# Patient Record
Sex: MALE | Race: WHITE | NOT HISPANIC OR LATINO | URBAN - METROPOLITAN AREA
[De-identification: names, ages, dates, MRNs, and addresses within clinical notes are randomized per-mention and may not be internally consistent; named-entity substitution may affect disease eponyms.]

---

## 2017-01-17 DIAGNOSIS — F90.2 ATTENTION DEFICIT HYPERACTIVITY DISORDER (ADHD), COMBINED TYPE: ICD-10-CM

## 2017-01-17 DIAGNOSIS — F17.200 TOBACCO USE DISORDER: ICD-10-CM

## 2017-01-17 DIAGNOSIS — F90.9 ADULT ADHD: Primary | ICD-10-CM

## 2017-01-17 NOTE — TELEPHONE ENCOUNTER
Patient requesting refill of Adderall.   Last appointment with Dr. Zavala was 10/10/16.   Last prescription 12/10/16  #90 with no refills.   Lab work was done 12/19/16.   First prescription pended to PCP.

## 2017-01-19 RX ORDER — DEXTROAMPHETAMINE SACCHARATE, AMPHETAMINE ASPARTATE, DEXTROAMPHETAMINE SULFATE AND AMPHETAMINE SULFATE 5; 5; 5; 5 MG/1; MG/1; MG/1; MG/1
20 TABLET ORAL 3 TIMES DAILY PRN
Qty: 90 TABLET | Refills: 0 | Status: SHIPPED | OUTPATIENT
Start: 2017-01-19 | End: 2017-02-20

## 2017-01-19 NOTE — TELEPHONE ENCOUNTER
Regarding: Rx REQUESTS  Contact: [phone]  PT called and stated that he talked to someone the other day re: a request for his prescription for amphetamine-dextroamphetamine (ADDERALL) 20 MG tablet to be mailed to him.  He also stated that he would like to get a prescription for Chantax as discussed and would appreciate a CB regarding both.    January 19, 2017 10:47 AM  LM for patient. Advised will leave prescription for Adderall in lock box at [facility] main entrance. Dr. Zavala requesting urine drug screen. Once that is done, pending results, she would likely send out an additional 2 months worth of prescription refills. Requested patient call back to discuss further, and also to discuss prescription for Chantix.  Chelsea Florentino RN, Care Coordinator    January 19, 2017 11:34 AM  Spoke with patient. He will do urine drug screen when he picks up Adderall prescription. Patient is requesting a prescription for Chantix. He is very motivated to quit smoking at this time. Has tried patches, Wellbutrin, and cold turkey in the past and those options have not worked for him. About 6 years ago, he tried Chantix and was able to quit smoking for about 6 months. He routinely smokes 1+ packs of cigarettes per day.  Chelsea Florentino RN, Care Coordinator

## 2017-01-19 NOTE — TELEPHONE ENCOUNTER
Will do urine drug screen and RX one month supply.   Further refills if appropriate after urine drug screen assessment  Best wishes,  Moises Zavala MD

## 2017-01-27 ENCOUNTER — OFFICE VISIT (OUTPATIENT)
Dept: PHARMACY | Facility: CLINIC | Age: 27
End: 2017-01-27
Payer: MEDICAID

## 2017-01-27 DIAGNOSIS — F17.200 TOBACCO USE DISORDER: ICD-10-CM

## 2017-01-27 DIAGNOSIS — F90.9 ATTENTION DEFICIT HYPERACTIVITY DISORDER (ADHD), UNSPECIFIED ADHD TYPE: Primary | ICD-10-CM

## 2017-01-27 DIAGNOSIS — F90.9 ADULT ADHD: ICD-10-CM

## 2017-01-27 DIAGNOSIS — K21.9 GASTROESOPHAGEAL REFLUX DISEASE WITHOUT ESOPHAGITIS: ICD-10-CM

## 2017-01-27 PROCEDURE — 80307 DRUG TEST PRSMV CHEM ANLYZR: CPT | Performed by: FAMILY MEDICINE

## 2017-01-27 PROCEDURE — 99605 MTMS BY PHARM NP 15 MIN: CPT | Performed by: PHARMACIST

## 2017-01-27 PROCEDURE — 99607 MTMS BY PHARM ADDL 15 MIN: CPT | Performed by: PHARMACIST

## 2017-01-27 PROCEDURE — 40000358 ZZHCL STATISTIC DRUG SCREEN MULTIPLE (METRO): Performed by: FAMILY MEDICINE

## 2017-01-27 NOTE — PROGRESS NOTES
"SUBJECTIVE/OBJECTIVE:                                                    Gabe Reddy is a 26 year old male coming in for an initial visit for Medication Therapy Management.  He was referred to me from Dr. Zavala.     Chief Complaint: Pt interested in options for smoking cessation.    Allergies/ADRs: None  Tobacco: > 1 pack per day -  is interested in quitting Tobacco Cessation Action Plan: Medication Therapy Management  (Referral to MTM)  Alcohol: 1-3 beverages / week    Medication Adherence: no issues reported    Attention Deficit Hyperactivity Disorder (ADHD): Current medication(s): Adderall 20 mg tablets three times daily as needed. Mood appears good. Pt is currently being followed by Dr. Zavala. Pt has no concerns about this medication today.    GERD: Current medication(s): Prilosec (omeprazole) 20 mg once daily. The patient does notice symptoms if they miss a dose. Patient has tried a trial off of therapy and is not interested in doing so. Patient feels that current regimen is effective.    Smoking Cessation:  How much does he smoke: 1-1.5 pack/day  Why does he smoke: Pt reports that he often smokes as a result of boredom. He will occassionaly smoke to decompress after work.   Triggers for smoking include: Alcohol, driving, and after eating dinner.  How long has he been smokin years, started smoking at ~15 years of age.  What is the most he ever smoked: 2 packs/day, occurred a couple times.  What is the least he ever smoked: 1 pack/day.  Tried quitting in the past: Yes.  How many times:  5.  For how long: At 20 years of age, pt was prescribed Chantix and was able to quit smoking for ~20 days. Pt mentioned experiencing \"weird dreams\" with Chantix. Last week, the patient went \"cold turkey\" for 4 days before restarting. This was the longest period that pt was without cigarettes using this method. He lived at his friends' homes in order to distract him from smoking.    What worked/didn t work in the " "past: At 19 years of age, pt was prescribed bupropion for depression and smoking cessation. Pt found the medication to be ineffective for quitting smoking. With the past year, pt contacted the [place] Quit Line and was sent the nicotine replacement patch system (steps 1-3). The nicotine replacement patches did not help with cravings. In addition, pt reported he was unwilling to quit at the time. He has used E-cigarettes, which caused a lot of lung irration and coughing, resulting in discontinuation. Pt has tried non-conventional smoking cessation methods, including sucking on straws and burning candles. Pt has not tried nicotine gum or lozenges. Of note, pt enjoys using the \"Quit Smoking\" lexus on his phone, this lexus has been highly motivational.     Why does he want to quit (motivators for quitting): Pt is concerned about his health, wanting to run/walk without experiencing shortness of breath. He is worried about the long term health effects of smoking.  What scares you about quitting? Continuing to have urges.  How important is it for you to quit on a scale of 0 - 10? 9-10.  How confident are you that you can stop smoking on a scale of 0-10: 8  History of seizures/bipolar disease: No  Occupation:  at [employer]    Current labs include:  BP Readings from Last 3 Encounters:   10/10/16 115/78   09/19/16 122/82   07/18/16 120/81     Today's Vitals: /89 mmHg  Pulse 89  Wt 217 lb (98.431 kg)  A1C      5.7   12/19/2016.  CHOL      200   12/19/2016  TRIG       90   12/19/2016  HDL       45   12/19/2016  LDL      138   12/19/2016    Liver Function Studies -   Recent Labs   Lab Test  12/19/16   1844   PROTTOTAL  8.4   ALBUMIN  4.3   BILITOTAL  0.5   ALKPHOS  77   AST  29   ALT  50       No results found for: UCRR, MICROL, UMALCR    Last Basic Metabolic Panel:  NA      138   12/19/2016   POTASSIUM      4.4   12/19/2016  CHLORIDE      106   12/19/2016  BUN       11   12/19/2016  CR     0.89   " "12/19/2016  GFR ESTIMATE   Date Value Ref Range Status   12/19/2016 >90  Non  GFR Calc   >60 mL/min/1.7m2 Final   06/16/2012 >90 >60 mL/min/1.7m2 Final     GFR ESTIMATE IF BLACK   Date Value Ref Range Status   12/19/2016 >90   GFR Calc   >60 mL/min/1.7m2 Final   06/16/2012 >90 >60 mL/min/1.7m2 Final     TSH   Date Value Ref Range Status   12/19/2016 0.64 0.40 - 4.00 mU/L Final   ]    Most Recent Immunizations   Administered Date(s) Administered     DTAP (<7y) 01/22/1996     Hepatitis B 11/24/2000     IPV 01/22/1996     Influenza (IIV3) 12/09/2014     MMR 09/24/2002     TD (ADULT, 7+) 09/24/2002     TDAP (BOOSTRIX AGES 10-64) 05/06/2014     ASSESSMENT:                                                       Current medications were reviewed today.     Medication Adherence: no issues identified    Attention Deficit Hyperactivity Disorder (ADHD): Stable.    GERD: Needs improvement. Consider switching from daily omeprazole (Prilosec) to ranitidine (Zantac). Prolonged use of omeprazole (Prilosec) can increase risk for electrolyte abnormalities (low calcium, low magnesium), reducing pt's overall bone density. Pt is not open to other med changes today.    Smoking Cessation: Needs improvement. Supplied pt with the \"Quit Plan\" booklet. Pt uninterested in nicotine replacement at this time. Since Chantix appeared to be effective in the past, pt interested in a re-challenge. Pt scheduled quit date for next Sunday, February 4th.          PLAN:                                                      1. I sent order for Chantix to pt's pharmacy. Pt to start Chantix this week.     2. Pt to let us know when he is ready to try taper off omeprazole.    I spent 60 minutes with this patient today.  All changes were made via collaborative practice agreement with Moises Zavala. A copy of the visit note was provided to the patient's primary care provider.    Will follow up in two weeks in office.    The " patient was given a summary of these recommendations as an after visit summary.     Sonal Elias PharmD  Medication Therapy Management Provider  Phone:[phone]  Pager: [phone]

## 2017-01-27 NOTE — PATIENT INSTRUCTIONS
"Recommendations from today's MTM visit:                                                    MTM (medication therapy management) is a service provided by a clinical pharmacist designed to help you get the most of out of your medicines.   Today we reviewed what your medicines are for, how to know if they are working, that your medicines are safe and how to make your medicine regimen as easy as possible.     1. Consider switching from daily omeprazole (Prilosec) to ranitidine (Zantac). Prolonged use of omeprazole (Prilosec) can increase risk for electrolyte abnormalities (low calcium, low magnesium), reducing your overall bone density.         2. I sent order for Chantix to your pharmacy. This medication may cause mild nausea and weird dreams. Discontinue this medication and contact your provider in the case of mood changes, traumatic dreams, and suicidal thoughts.     3. Start your \"Quit Plan\" booklet.     Next MTM visit: I will call you again in two Fridays around 2:30pm    To schedule another MTM appointment, please call the clinic directly or you may call the MTM scheduling line at [phone] or toll-free at [phone].     My Clinical Pharmacist's contact information:                                                      It was a pleasure seeing you today!  Please feel free to contact me with any questions or concerns you have.      Sonal Elias, [place]  Medication Therapy Management Provider  Phone:[phone]  Pager: [phone]    You may receive a survey about the MTM services you received.  I would appreciate your feedback to help me serve you better in the future. Please fill it out and return it when you can. Your comments will be anonymous.            "

## 2017-01-30 LAB
ACETAMINOPHEN QUAL: NEGATIVE
AMANTADINE: NEGATIVE
AMITRIPTYLINE QUAL: NEGATIVE
AMOXAPINE: NEGATIVE
AMPHETAMINES QUAL: POSITIVE
ATROPINE: NEGATIVE
BENZODIAZ UR QL: ABNORMAL
CAFFEINE QUAL: NEGATIVE
CANNABINOIDS UR QL SCN: ABNORMAL
CARBAMAZEPINE QUAL: NEGATIVE
CHLORPHENIRAMINE: NEGATIVE
CHLORPROMAZINE: NEGATIVE
CITALOPRAM QUAL: NEGATIVE
CLOMIPRAMINE QUAL: NEGATIVE
COCAINE QUAL: NEGATIVE
COCAINE UR QL: ABNORMAL
CODEINE QUAL: NEGATIVE
DESIPRAMINE QUAL: NEGATIVE
DEXTROMETHORPHAN: NEGATIVE
DIPHENHYDRAMINE: POSITIVE
DOXEPIN/METABOLITE: NEGATIVE
DOXYLAMINE: NEGATIVE
EPHEDRINE OR PSEUDO: NEGATIVE
FENTANYL QUAL: NEGATIVE
FLUOXETINE AND METAB: NEGATIVE
HYDROCODONE QUAL: NEGATIVE
HYDROMORPHONE QUAL: NEGATIVE
IBUPROFEN QUAL: NEGATIVE
IMIPRAMINE QUAL: NEGATIVE
LAMOTRIGINE QUAL: NEGATIVE
LOXAPINE: NEGATIVE
MAPROTYLINE: NEGATIVE
MDMA QUAL: NEGATIVE
MEPERIDINE QUAL: NEGATIVE
METHAMPHETAMINE: NEGATIVE
METHODONE QUAL: NEGATIVE
MORPHINE QUAL: NEGATIVE
NICOTINE: POSITIVE
NORTRIPTYLINE QUAL: NEGATIVE
OLANZAPINE QUAL: NEGATIVE
OPIATES UR QL SCN: ABNORMAL
OXYCODONE QUAL: NEGATIVE
PENTAZOCINE: NEGATIVE
PHENCYCLIDINE QUAL: NEGATIVE
PHENMETRAZINE: NEGATIVE
PHENTERMINE: NEGATIVE
PHENYLBUTAZONE: NEGATIVE
PHENYLPROPANOLAMINE: NEGATIVE
PROPOXPHENE QUAL: NEGATIVE
PROPRANOLOL QUAL: NEGATIVE
PYRILAMINE: NEGATIVE
SALICYLATE QUAL: NEGATIVE
THEOBROMINE: NEGATIVE
TOPIRAMATE QUAL: NEGATIVE
TRIMIPRAMINE QUAL: NEGATIVE
VENLAFAXINE QUAL: ABNORMAL

## 2017-02-01 VITALS
DIASTOLIC BLOOD PRESSURE: 89 MMHG | WEIGHT: 217 LBS | HEART RATE: 89 BPM | BODY MASS INDEX: 32.03 KG/M2 | SYSTOLIC BLOOD PRESSURE: 133 MMHG

## 2017-02-10 ENCOUNTER — TELEPHONE (OUTPATIENT)
Dept: PHARMACY | Facility: CLINIC | Age: 27
End: 2017-02-10

## 2017-02-10 NOTE — TELEPHONE ENCOUNTER
Pt called to update on progress with quitting smoking. Pt reports no side effects on Chantix since starting on Saturday the 4th. Pt will be quitting on the 12th (this coming Sunday). Pt is smoking 1/2 ppd, maybe a little more. He does not foresee any specific barriers to quitting this weekend and has plans to keep himself busy. We will talk again next week to see how pt is doing after his quit date.    Sonal Elias, PharmD  Medication Therapy Management Provider  Phone:[phone]  Pager: [phone]

## 2017-02-16 ENCOUNTER — TELEPHONE (OUTPATIENT)
Dept: PHARMACY | Facility: CLINIC | Age: 27
End: 2017-02-16

## 2017-02-16 DIAGNOSIS — F17.200 TOBACCO USE DISORDER: Primary | ICD-10-CM

## 2017-02-16 RX ORDER — NICOTINE 21 MG/24HR
1 PATCH, TRANSDERMAL 24 HOURS TRANSDERMAL EVERY 24 HOURS
Qty: 14 PATCH | Refills: 1 | Status: SHIPPED | OUTPATIENT
Start: 2017-02-16 | End: 2017-03-27

## 2017-02-16 NOTE — TELEPHONE ENCOUNTER
Pt called to discuss smoking cessation. He quit smoking on Saturday night. Pt was having difficulty on day 3 and so he is using his Chantix and the 14mg patch together. He still has urges but he feels much better on the patch. He has two weeks worth of patches and is wondering if he needs more. I sent an order for 2 weeks worth more so that he could be on the 14mg patches for at least 4 weeks. We will talk again in 2 weeks.    Sonal Elias, PharmD  Medication Therapy Management Provider  Phone:[phone]  Pager: [phone]

## 2017-02-20 DIAGNOSIS — F90.9 ADULT ADHD: ICD-10-CM

## 2017-02-20 DIAGNOSIS — F90.2 ATTENTION DEFICIT HYPERACTIVITY DISORDER (ADHD), COMBINED TYPE: ICD-10-CM

## 2017-02-20 RX ORDER — DEXTROAMPHETAMINE SACCHARATE, AMPHETAMINE ASPARTATE, DEXTROAMPHETAMINE SULFATE AND AMPHETAMINE SULFATE 5; 5; 5; 5 MG/1; MG/1; MG/1; MG/1
20 TABLET ORAL 3 TIMES DAILY PRN
Qty: 90 TABLET | Refills: 0 | Status: SHIPPED | OUTPATIENT
Start: 2017-03-22 | End: 2017-04-25

## 2017-02-20 RX ORDER — DEXTROAMPHETAMINE SACCHARATE, AMPHETAMINE ASPARTATE, DEXTROAMPHETAMINE SULFATE AND AMPHETAMINE SULFATE 5; 5; 5; 5 MG/1; MG/1; MG/1; MG/1
20 TABLET ORAL 3 TIMES DAILY PRN
Qty: 90 TABLET | Refills: 0 | Status: SHIPPED | OUTPATIENT
Start: 2017-02-20 | End: 2017-02-20

## 2017-02-20 NOTE — TELEPHONE ENCOUNTER
Dr. Zavala,   Patient requesting refills of Adderall. Per refill encounter dated 1/17/17, you gave him one refill and wanted him to do a urine drug screen. Said that if drug screen was within normal limits, you would provide prescriptions for additional 2 months of medication.

## 2017-02-20 NOTE — TELEPHONE ENCOUNTER
Gabe chart reviewed today for refill request.    Diagnoses and all orders for this visit:    Adult ADHD  -      amphetamine-dextroamphetamine (ADDERALL) 20 MG per tablet; Take 1 tablet (20 mg) by mouth 3 times daily as needed first month 2/20  -     amphetamine-dextroamphetamine (ADDERALL) 20 MG per tablet; Take 1 tablet (20 mg) by mouth 3 times daily as needed second month fill date 3/22  Moises Zavala MD

## 2017-02-21 NOTE — TELEPHONE ENCOUNTER
Prescriptions placed in lock box at [facility] main entrance. LM notifying patient.

## 2017-02-27 ENCOUNTER — OFFICE VISIT (OUTPATIENT)
Dept: INTERNAL MEDICINE | Facility: CLINIC | Age: 27
End: 2017-02-27

## 2017-02-27 VITALS
BODY MASS INDEX: 30.61 KG/M2 | WEIGHT: 207.3 LBS | SYSTOLIC BLOOD PRESSURE: 146 MMHG | DIASTOLIC BLOOD PRESSURE: 84 MMHG | HEART RATE: 108 BPM

## 2017-02-27 DIAGNOSIS — F19.982 DRUG INDUCED INSOMNIA (H): Primary | ICD-10-CM

## 2017-02-27 RX ORDER — LORAZEPAM 1 MG/1
1-2 TABLET ORAL
Qty: 30 TABLET | Refills: 0 | Status: SHIPPED | OUTPATIENT
Start: 2017-02-27 | End: 2017-03-27

## 2017-02-27 ASSESSMENT — PAIN SCALES - GENERAL: PAINLEVEL: NO PAIN (0)

## 2017-02-27 NOTE — NURSING NOTE
Chief Complaint   Patient presents with     Sleep Problem     Patient here for sleeping problems that have gotten worse in the last 3 weeks.       Corry Cosme CMA at 5:16 PM on 2/27/2017.

## 2017-02-27 NOTE — PROGRESS NOTES
S: Gabe is here b/c he is struggling with insomnia. He has had problems sleeping for the past 1-2 years, but it is acutely worse in the last couple weeks. He has trid many things to help get some sleep. He has been taking up to five benadryl before sleep without relief. He also has tried breathing, avoiding electronics, which hasn't helped. He also has been on trazodone in the past which didn't help. He notes this has all gotten worse since he quit smoking, which he started Chantix for on 1/27. Gabe notes that he has had a few nights where he hasn't gotten any sleep, or around 30 min. This is also making him more anxious about going to bed, being worried about not getting to sleep and then worried about being too groggy for work. He actually has had to miss work twice in the past couple weeks b/c he hasn't had any sleep. Finally, he does not feel the adderall is impacting his sleep as he has been on this for quite a while.     O:   /84 (BP Location: Right arm, Patient Position: Chair, Cuff Size: Adult Regular)  Pulse 108  Wt 94 kg (207 lb 4.8 oz)  BMI 30.61 kg/m2  General: Pleasant male, in NAD  Neuro: AOX3, no focal deficits    A/P:   Gabe was seen today for sleep problem.    Diagnoses and all orders for this visit:    Drug induced insomnia (H). Suspect this is acutely worse 2/2 chantix, but he may have underlying primary insomnia. Want to avoid ambien, as I feel this may worsen side effects of chantix (dreams). Will trial ativan, as this will have an anxiolytic component to it as well, as anxiety is likely playing a role in this. Discussed taking 1mg tablet to start with, and that this is short term and not for every night.   -     LORazepam (ATIVAN) 1 MG tablet; Take 1-2 tablets (1-2 mg) by mouth nightly as needed for anxiety or other (insomnia)    RTC in 1 mo to f/u on sleep. Will send message to Sonal Elias as she has been helping with smoking cessation. Finally, Gabe would like me to be his PCP, so I  have made that change.     Claudia Lowe MD  02/27/17

## 2017-02-27 NOTE — PATIENT INSTRUCTIONS
Primary Care Center Medication Refill Request Information:  * Please contact your pharmacy regarding ANY request for medication refills.  ** [facility] Prescription Fax = [phone]  * Please allow 3 business days for routine medication refills.  * Please allow 5 business days for controlled substance medication refills.     Primary Care Center Test Result notification information:  *You will be notified with in 7-10 days of your appointment day regarding the results of your test.  If you are on MyChart you will be notified as soon as the provider has reviewed the results and signed off on them.

## 2017-02-27 NOTE — MR AVS SNAPSHOT
After Visit Summary   2/27/2017    Gabe Reddy    MRN: [id number]           Patient Information     Date Of Birth          1990        Visit Information        Provider Department      2/27/2017 5:30 PM Claudia Benavides MD [facility]        Today's Diagnoses     Drug induced insomnia (H)    -  1      Care Instructions    Primary Care Center Medication Refill Request Information:  * Please contact your pharmacy regarding ANY request for medication refills.  ** [facility] Prescription Fax = [phone]  * Please allow 3 business days for routine medication refills.  * Please allow 5 business days for controlled substance medication refills.     Primary Care Center Test Result notification information:  *You will be notified with in 7-10 days of your appointment day regarding the results of your test.  If you are on MyChart you will be notified as soon as the provider has reviewed the results and signed off on them.          Follow-ups after your visit        Follow-up notes from your care team     Return in about 4 weeks (around 3/27/2017) for insomnia.      Your next 10 appointments already scheduled     Mar 02, 2017  3:00 PM CST   TELEMEDICINE with Sonal Elias RPH   [facility] Medication Therapy Management ([facility])    [address] [place] [id number] [phone]           Note: this is not an onsite visit; there is no need to come to the facility.            Mar 27, 2017  5:00 PM CDT   (Arrive by 4:45 PM)   Return Visit with Claudia Lowe MD   [facility] ([facility])    [address] [place] [id number] [phone]              Who to contact     Please call your clinic at [phone] to:    Ask questions about your health    Make or cancel appointments    Discuss your medicines    Learn about your test  results    Speak to your doctor   If you have compliments or concerns about an experience at your clinic, or if you wish to file a complaint, please contact [facility] Patient Relations at [phone] or email us at [email or web address]         Additional Information About Your Visit        [employer] Information     [employer] is an electronic gateway that provides easy, online access to your medical records. With [employer], you can request a clinic appointment, read your test results, renew a prescription or communicate with your care team.     To sign up for [employer] visit the website at [email or web address]   You will be asked to enter the access code listed below, as well as some personal information. Please follow the directions to create your username and password.     Your access code is: [email or web address]  Expires: 2017  6:30 AM     Your access code will  in 90 days. If you need help or a new code, please contact your [facility] Clinic or call [phone] for assistance.        Care EveryWhere ID     This is your Care EveryWhere ID. This could be used by other organizations to access your [facility] medical records  [id number]        Your Vitals Were     Pulse BMI (Body Mass Index)                108 30.61 kg/m2           Blood Pressure from Last 3 Encounters:   17 146/84   17 133/89   10/10/16 115/78    Weight from Last 3 Encounters:   17 94 kg (207 lb 4.8 oz)   17 98.4 kg (217 lb)   10/10/16 98.3 kg (216 lb 11.2 oz)              Today, you had the following     No orders found for display         Today's Medication Changes          These changes are accurate as of: 17  5:37 PM.  If you have any questions, ask your nurse or doctor.               Start taking these medicines.        Dose/Directions    LORazepam 1 MG tablet   Commonly known as:  ATIVAN   Used for:  Drug induced insomnia (H)   Started by:  Manuel  Claudia Lowe MD        Dose:  1-2 mg   Take 1-2 tablets (1-2 mg) by mouth nightly as needed for anxiety or other (insomnia)   Quantity:  30 tablet   Refills:  0         These medicines have changed or have updated prescriptions.        Dose/Directions    omeprazole 20 MG CR capsule   Commonly known as:  priLOSEC   This may have changed:  when to take this   Used for:  Gastroesophageal reflux disease without esophagitis        Dose:  20 mg   Take 1 capsule (20 mg) by mouth daily as needed   Quantity:  90 capsule   Refills:  3            Where to get your medicines      Some of these will need a paper prescription and others can be bought over the counter.  Ask your nurse if you have questions.     Bring a paper prescription for each of these medications     LORazepam 1 MG tablet                Primary Care Provider Office Phone # Fax #    Moises Zavala [phone]        [facility] [address] [facility] [address]  [facility] [address]        Thank you!     Thank you for choosing [facility]  for your care. Our goal is always to provide you with excellent care. Hearing back from our patients is one way we can continue to improve our services. Please take a few minutes to complete the written survey that you may receive in the mail after your visit with us. Thank you!             Your Updated Medication List - Protect others around you: Learn how to safely use, store and throw away your medicines at www.disposemymeds.org.          This list is accurate as of: 2/27/17  5:37 PM.  Always use your most recent med list.                   Brand Name Dispense Instructions for use    amphetamine-dextroamphetamine 20 MG per tablet   Start taking on:  3/22/2017    ADDERALL    90 tablet    Take 1 tablet (20 mg) by mouth 3 times daily as needed       LORazepam 1 MG tablet    ATIVAN    30 tablet    Take 1-2 tablets (1-2 mg) by mouth nightly as needed for anxiety or other (insomnia)        nicotine 14 MG/24HR 24 hr patch    NICODERM CQ    14 patch    Place 1 patch onto the skin every 24 hours       omeprazole 20 MG CR capsule    priLOSEC    90 capsule    Take 1 capsule (20 mg) by mouth daily as needed       varenicline 0.5 MG X 11 & 1 MG X 42 tablet    CHANTIX STARTING MONTH PAK    53 tablet    Take as directed per package instructions.

## 2017-03-02 ENCOUNTER — TELEPHONE (OUTPATIENT)
Dept: PHARMACY | Facility: CLINIC | Age: 27
End: 2017-03-02

## 2017-03-02 DIAGNOSIS — F17.200 TOBACCO USE DISORDER: Primary | ICD-10-CM

## 2017-03-02 RX ORDER — VARENICLINE TARTRATE 1 MG/1
1 TABLET, FILM COATED ORAL 2 TIMES DAILY
Qty: 60 TABLET | Refills: 1 | Status: SHIPPED | OUTPATIENT
Start: 2017-03-02 | End: 2017-07-31

## 2017-03-02 NOTE — TELEPHONE ENCOUNTER
"Pt called to see how he is doing with quitting smoking. He remains smoke-free (Quit date 2/12) and is still having significant cravings but is managing them. He has discussed with Dr. Jackson and notes today that his chronic insomnia has worsened dramatically since quitting smoking. He is not sure it is the Chantix but is wondering what to do. He feels the Ativan from Dr. Jackson is helping somewhat.    When asked about his nicotine patches, he says he is currently using the 14mg patches and leaving them on overnight. This is the most likely cause of his insomnia. We discussed that quitting smoking is probably also contributing. We discussed that it is possible that the Chantix is causing problems but he says he is not sure it is and would like to stay on it.    I asked if he would be willing to come in to discuss options for insomnia long-term and he is not interested, as he \"doesn't want so many doctors\". I asked if I could send suggestions to Dr. Jackson and he is open to this and asked what I had in mind. We briefly discussed benefits and risks of SSRIs and TCAs. He asked about Ambien and I explained that ideally we do not use Z-drugs or benzos long-term due to potential for chemical and psychological dependence. He states that he has been on Zoloft in the past and did not like it so he is not thrilled with the idea of trying an SSRI. He is not interested in any meds that had even a remote chance of weight gain (TCAs) as he is trying desperately to lose weight. He has tried and failed trazodone. Taking excessive doses of diphenhydramine have become ineffective (and could very well be making it difficult for him to lose weight).    When asked about his sleep patterns, he says he has no problem once he's asleep but that he has trouble shutting off his brain and often is anticipating stressors of the following day or obsessing about timing. As such, in my professional opinion, I think it would be best to try therapies " that treat anxiety or both anxiety and insomnia instead of those that treat insomnia alone. Paroxetine and fluvoxamine are the most sedating SSRIs, so if the patient could be talked into taking one of these in the evening, I think it would be ideal. Feel free to contact me for further discussion.    Pt will follow up with me in 1 month via phone to make sure he is still feeling supported for smoking cessation. Refill for Chantix sent per verbal from Dr. Jackson.    Sonal Elias, PharmD  Medication Therapy Management Provider  Phone:[phone]  Pager: [phone]

## 2017-03-25 ENCOUNTER — OFFICE VISIT (OUTPATIENT)
Dept: URGENT CARE | Facility: URGENT CARE | Age: 27
End: 2017-03-25
Payer: COMMERCIAL

## 2017-03-25 VITALS
BODY MASS INDEX: 29.62 KG/M2 | WEIGHT: 200 LBS | DIASTOLIC BLOOD PRESSURE: 78 MMHG | TEMPERATURE: 100.9 F | HEIGHT: 69 IN | HEART RATE: 117 BPM | SYSTOLIC BLOOD PRESSURE: 112 MMHG | OXYGEN SATURATION: 97 %

## 2017-03-25 DIAGNOSIS — R11.0 NAUSEA: ICD-10-CM

## 2017-03-25 DIAGNOSIS — R19.7 DIARRHEA OF PRESUMED INFECTIOUS ORIGIN: Primary | ICD-10-CM

## 2017-03-25 PROCEDURE — 87506 IADNA-DNA/RNA PROBE TQ 6-11: CPT | Performed by: FAMILY MEDICINE

## 2017-03-25 PROCEDURE — 87493 C DIFF AMPLIFIED PROBE: CPT | Performed by: FAMILY MEDICINE

## 2017-03-25 PROCEDURE — 99214 OFFICE O/P EST MOD 30 MIN: CPT | Performed by: FAMILY MEDICINE

## 2017-03-25 RX ORDER — ONDANSETRON 4 MG/1
8 TABLET, ORALLY DISINTEGRATING ORAL ONCE
Qty: 2 TABLET | Refills: 0
Start: 2017-03-25 | End: 2017-03-25

## 2017-03-25 RX ORDER — ONDANSETRON 8 MG/1
8 TABLET, ORALLY DISINTEGRATING ORAL
Qty: 9 TABLET | Refills: 0 | Status: SHIPPED | OUTPATIENT
Start: 2017-03-25

## 2017-03-25 NOTE — LETTER
[facility]   [address], [place]  [address]  [phone]      March 25, 2017      To Whom It May Concern,    Gabe Reddy was seen today at [facility].  He is not able to attend work tomorrow secondary to a medical illness.   If better, he may attend work for his next regular shift starting March 28, 2017. Thanks.        Sincerely,        Gayla Reed MD

## 2017-03-25 NOTE — MR AVS SNAPSHOT
After Visit Summary   3/25/2017    Gabe Reddy    MRN: [id number]           Patient Information     Date Of Birth          1990        Visit Information        Provider Department      3/25/2017 7:00 PM Gayla Echols MD [facility]        Today's Diagnoses     Diarrhea of presumed infectious origin    -  1    Nausea          Care Instructions      Uncertain Causes of Diarrhea (Adult)    Diarrhea is when stools are loose and watery. This can be caused by:    Viral infections    Bacterial infections    Food poisoning    Parasites    Irritable bowel syndrome (IBS)    Inflammatory bowel diseases such as ulcerative colitis, Crohn's disease, and celiac disease    Food intolerance, such as to lactose, the sugar found in milk and milk products    Reaction to medicines like antibiotics, laxatives, cancer drugs, and antacids  Along with diarrhea, you may also have:    Abdominal pain and cramping    Nausea and vomiting    Loss of bowel control    Fever and chills    Bloody stools  In some cases, antibiotics may help to treat diarrhea. You may have a stool sample test. This is done to see what is causing your diarrhea, and if antibiotics will help treat it. The results of a stool sample test may take up to 2 days. The healthcare provider may not give you antibiotics until he or she has the stool test results.  Diarrhea can cause dehydration. This is the loss of too much water and other fluids from the body. When this occurs, body fluid must be replaced. This can be done with oral rehydration solutions. Oral rehydration solutions are available at drugstores and grocery stores without a prescription.  Home care  Follow all instructions given by your healthcare provider. Rest at home for the next 24 hours, or until you feel better. Avoid caffeine, tobacco, and alcohol. These can make diarrhea, cramping, and pain worse.  If taking medicines:    Don t take  over-the-counter diarrhea or nausea medicines unless your healthcare provider tells you to.    You may use acetaminophen or NSAID medicines like ibuprofen or naproxen to reduce pain and fever. Don t use these if you have chronic liver or kidney disease, or ever had a stomach ulcer or gastrointestinal bleeding. Don't use NSAID medicines if you are already taking one for another condition (like arthritis) or are on daily aspirin therapy (such as for heart disease or after a stroke). Talk with your healthcare provider first.    If antibiotics were prescribed, be sure you take them until they are finished. Don t stop taking them even when you feel better. Antibiotics must be taken as a full course.  To prevent the spread of illness:    Remember that washing with soap and water and using alcohol-based  is the best way to prevent the spread of infection.    Clean the toilet after each use.    Wash your hands before eating.    Wash your hands before and after preparing food. Keep in mind that people with diarrhea or vomiting should not prepare food for others.    Wash your hands after using cutting boards, countertops, and knives that have been in contact with raw foods.    Wash and then peel fruits and vegetables.    Keep uncooked meats away from cooked and ready-to-eat foods.    Use a food thermometer when cooking. Cook poultry to at least 165 F (74 C). Cook ground meat (beef, veal, pork, lamb) to at least 160 F (71 C). Cook fresh beef, veal, lamb, and pork to at least 145 F (63 C).    Don t eat raw or undercooked eggs (poached or linda side up), poultry, meat, or unpasteurized milk and juices.  Food and drinks  The main goal while treating vomiting or diarrhea is to prevent dehydration. This is done by taking small amounts of liquids often.    Keep in mind that liquids are more important than food right now.    Drink only small amounts of liquids at a time.    Don t force yourself to eat, especially if you  are having cramping, vomiting, or diarrhea. Don t eat large amounts at a time, even if you are hungry.    If you eat, avoid fatty, greasy, spicy, or fried foods.    Don t eat dairy foods or drink milk if you have diarrhea. These can make diarrhea worse.  During the first 24 hours you can try:    Oral rehydration solutions. Do not use sports drinks. They have too much sugar and not enough electrolytes.    Soft drinks without caffeine    Ginger ale    Water (plain or flavored)    Decaf tea or coffee    Clear broth, consommé, or bouillon    Gelatin, popsicles, or frozen fruit juice bars  The second 24 hours, if you are feeling better, you can add:    Hot cereal, plain toast, bread, rolls, or crackers    Plain noodles, rice, mashed potatoes, chicken noodle soup, or rice soup    Unsweetened canned fruit (no pineapple)    Bananas  As you recover:    Limit fat intake to less than 15 grams per day. Don t eat margarine, butter, oils, mayonnaise, sauces, gravies, fried foods, peanut butter, meat, poultry, or fish.    Limit fiber. Don t eat raw or cooked vegetables, fresh fruits except bananas, or bran cereals.    Limit caffeine and chocolate.    Limit dairy.    Don t use spices or seasonings except salt.    Go back to your normal diet over time, as you feel better and your symptoms improve.    If the symptoms come back, go back to a simple diet or clear liquids.  Follow-up care  Follow up with your healthcare provider, or as advised. If a stool sample was taken or cultures were done, call the healthcare provider for the results as instructed.  Call 911  Call 911 if you have any of these symptoms:    Trouble breathing    Confusion    Extreme drowsiness or trouble walking    Loss of consciousness    Rapid heart rate    Chest pain    Stiff neck    Seizure  When to seek medical advice  Call your healthcare provider right away if any of these occur:    Abdominal pain that gets worse    Constant lower right abdominal  pain    Continued vomiting and inability to keep liquids down    Diarrhea more than 5 times a day    Blood in vomit or stool    Dark urine or no urine for 8 hours, dry mouth and tongue, tiredness, weakness, or dizziness    Drowsiness    New rash    You don t get better in 2 to 3 days    Fever of 100.4 F (38 C) or higher that doesn t get lower with medicine    7012-9552 [employer]. [address] [place] [address], [place] [address]. All rights reserved. This information is not intended as a substitute for professional medical care. Always follow your healthcare professional's instructions.        Treating Diarrhea  Diarrhea occurs when you have loose, watery, or frequent bowel movements. It is a common problem with many causes. Most cases of diarrhea clear up on their own. But certain cases may need treatment. Be sure to see your health care provider if your symptoms do not improve within a few days.    Getting Relief  Treatment of diarrhea depends on its cause. Diarrhea caused by bacterial or parasite infection is often treated with antibiotics. Diarrhea caused by other factors, such as a stomach virus, often improves with simple home treatment. The tips below may also help relieve your symptoms.    Drink plenty of fluids. This helps prevent too much fluid loss (dehydration). Water, clear soups, and electrolyte solutions are good choices. Avoid alcohol, coffee, tea, and milk. These can make symptoms worse.    Suck on ice chips if drinking makes you queasy.    Return to your normal diet slowly. You may want to eat bland foods at first, such as rice and toast. Also, you may need to avoid certain foods for a while, such as dairy products. These can make symptoms worse. Ask your health care provider if there are any other foods you should avoid.    If you were prescribed antibiotics, take them as directed.    Do not take anti-diarrhea medications without asking your health care provider first.  Call Your Health  Care Provider If You Have:    Fever of 102 F (38.0 C) or higher    Severe pain    Worsening diarrhea or diarrhea for more than 2 days    Bloody vomit or stool    Signs of dehydration (dizziness, dry mouth and tongue, rapid pulse, dark urine)    8048-4675 [employer]. [address] [place] [address]. All rights reserved. This information is not intended as a substitute for professional medical care. Always follow your healthcare professional's instructions.              Follow-ups after your visit        Your next 10 appointments already scheduled     Mar 27, 2017  5:00 PM CDT   (Arrive by 4:45 PM)   Return Visit with Claudia Lowe MD   [facility] ([facility] [place])    [address] [facility] [id number] [phone]            Apr 06, 2017  3:00 PM CDT   TELEMEDICINE with Sonal Elias RPH   [facility] Medication Therapy Management ([facility] [place])    [address]  [address] [facility] [id number] [phone]           Note: this is not an onsite visit; there is no need to come to the facility.              Future tests that were ordered for you today     Open Future Orders        Priority Expected Expires Ordered    Enteric Bacteria and Virus Panel by LEANDRO Stool Routine  3/25/2018 3/25/2017    Clostridium difficile Toxin B PCR Routine  4/1/2017 3/25/2017            Who to contact     If you have questions or need follow up information about today's clinic visit or your schedule please contact [facility] directly at [phone].  Normal or non-critical lab and imaging results will be communicated to you by MyChart, letter or phone within 4 business days after the clinic has received the results. If you do not hear from us within 7 days, please contact the clinic through MyChart or phone. If you have a critical or abnormal lab result, we will notify you  "by phone as soon as possible.  Submit refill requests through [employer] or call your pharmacy and they will forward the refill request to us. Please allow 3 business days for your refill to be completed.          Additional Information About Your Visit        [employer] Information     [employer] lets you send messages to your doctor, view your test results, renew your prescriptions, schedule appointments and more. To sign up, go to [email or web address] . Click on \"Log in\" on the left side of the screen, which will take you to the Welcome page. Then click on \"Sign up Now\" on the right side of the page.     You will be asked to enter the access code listed below, as well as some personal information. Please follow the directions to create your username and password.     Your access code is: [email or web address]  Expires: 2017  7:30 AM     Your access code will  in 90 days. If you need help or a new code, please call your [place] clinic or [phone].        Care EveryWhere ID     This is your Care EveryWhere ID. This could be used by other organizations to access your [place] medical records  [id number]        Your Vitals Were     Pulse Temperature Height Pulse Oximetry BMI (Body Mass Index)       117 100.9  F (38.3  C) (Oral) 5' 9\" (1.753 m) 97% 29.53 kg/m2        Blood Pressure from Last 3 Encounters:   17 112/78   17 146/84   17 133/89    Weight from Last 3 Encounters:   17 200 lb (90.7 kg)   17 207 lb 4.8 oz (94 kg)   17 217 lb (98.4 kg)                 Today's Medication Changes          These changes are accurate as of: 3/25/17  9:02 PM.  If you have any questions, ask your nurse or doctor.               Start taking these medicines.        Dose/Directions    * ondansetron 4 MG ODT tab   Commonly known as:  ZOFRAN ODT   Used for:  Nausea   Started by:  Gayla Echols MD        Dose:  8 mg   Take 2 tablets (8 mg) by mouth once for 1 dose   Quantity:  2 " tablet   Refills:  0       * ondansetron 8 MG ODT tab   Commonly known as:  ZOFRAN ODT   Used for:  Nausea   Started by:  Gayla Echols MD        Dose:  8 mg   Take 1 tablet (8 mg) by mouth 3 times daily (before meals)   Quantity:  9 tablet   Refills:  0       * Notice:  This list has 2 medication(s) that are the same as other medications prescribed for you. Read the directions carefully, and ask your doctor or other care provider to review them with you.      These medicines have changed or have updated prescriptions.        Dose/Directions    omeprazole 20 MG CR capsule   Commonly known as:  priLOSEC   This may have changed:  when to take this   Used for:  Gastroesophageal reflux disease without esophagitis        Dose:  20 mg   Take 1 capsule (20 mg) by mouth daily as needed   Quantity:  90 capsule   Refills:  3            Where to get your medicines      These medications were sent to [id number] - [place], [place] - [address] AT [facility] [address]  [address], [place] [address]    Hours:  24-hours Phone:  [phone]     ondansetron 8 MG ODT tab         Some of these will need a paper prescription and others can be bought over the counter.  Ask your nurse if you have questions.     You don't need a prescription for these medications     ondansetron 4 MG ODT tab                Primary Care Provider Office Phone # Fax #    Claudia Lowe [phone]       [address]  [facility] [address]        Thank you!     Thank you for choosing [facility]  for your care. Our goal is always to provide you with excellent care. Hearing back from our patients is one way we can continue to improve our services. Please take a few minutes to complete the written survey that you may receive in the mail after your visit with us. Thank you!             Your Updated Medication List - Protect  others around you: Learn how to safely use, store and throw away your medicines at www.disposemymeds.org.          This list is accurate as of: 3/25/17  9:02 PM.  Always use your most recent med list.                   Brand Name Dispense Instructions for use    amphetamine-dextroamphetamine 20 MG per tablet    ADDERALL    90 tablet    Take 1 tablet (20 mg) by mouth 3 times daily as needed       LORazepam 1 MG tablet    ATIVAN    30 tablet    Take 1-2 tablets (1-2 mg) by mouth nightly as needed for anxiety or other (insomnia)       nicotine 14 MG/24HR 24 hr patch    NICODERM CQ    14 patch    Place 1 patch onto the skin every 24 hours       omeprazole 20 MG CR capsule    priLOSEC    90 capsule    Take 1 capsule (20 mg) by mouth daily as needed       * ondansetron 4 MG ODT tab    ZOFRAN ODT    2 tablet    Take 2 tablets (8 mg) by mouth once for 1 dose       * ondansetron 8 MG ODT tab    ZOFRAN ODT    9 tablet    Take 1 tablet (8 mg) by mouth 3 times daily (before meals)       varenicline 1 MG tablet    CHANTIX CONTINUING MONTH BREN    60 tablet    Take 1 tablet (1 mg) by mouth 2 times daily Take 1 tablet by mouth twice daily.       * Notice:  This list has 2 medication(s) that are the same as other medications prescribed for you. Read the directions carefully, and ask your doctor or other care provider to review them with you.

## 2017-03-26 LAB
C DIFF TOX B STL QL: NORMAL
CAMPYLOBACTER GROUP BY NAT: NOT DETECTED
ENTERIC PATHOGEN COMMENT: ABNORMAL
NOROVIRUS I AND II BY NAT: NOT DETECTED
ROTAVIRUS A BY NAT: ABNORMAL
SALMONELLA SPECIES BY NAT: NOT DETECTED
SHIGA TOXIN 1 GENE BY NAT: NOT DETECTED
SHIGA TOXIN 2 GENE BY NAT: NOT DETECTED
SHIGELLA SP+EIEC IPAH STL QL NAA+PROBE: NOT DETECTED
SPECIMEN SOURCE: NORMAL
VIBRIO GROUP BY NAT: NOT DETECTED
YERSINIA ENTEROCOLITICA BY NAT: NOT DETECTED

## 2017-03-26 NOTE — PROGRESS NOTES
"(S) Gabe Reddy is a 26 year old male with complaint of gastrointestinal symptoms of anorexia, cramping, diarrhea, nausea and fever for 2 days. At first had watery diarrhea every 2 hours.  No blood in stool.  No emesis.  Has been taking Pepto-Bismol and that has decreased the number of stools today.  No recent antibiotics.  No recent travels.  No consumption of suspicious foods.  No ill contacts.  No h/o abdominal surgeries.  Works at [employer] and left work early today.      (O) /78 (BP Location: Right arm, Cuff Size: Adult Regular)  Pulse 117  Temp 100.9  F (38.3  C) (Oral)  Ht 5' 9\" (1.753 m)  Wt 200 lb (90.7 kg)  SpO2 97%  BMI 29.53 kg/m2   Physical exam reveals the patient appears mildly flushed. Hydration status: mildly dehydrated. Posterior OP is clear.  Ears: normal TMs.  Neck: supple, no adenopathy.  Lungs: CTA B.  Abdomen: +BS, abdomen is soft without significant tenderness, masses, organomegaly or guarding.    (A) Diarrhea likely infectious in origin    (P) I have recommended small amounts clear fluids frequently, Pedialyte, dilute gatorade, soups, juices, water, BRAT diet and advance diet as tolerated. Pepto-Bismol prn.  Await stool studies.  Note for work given. Return office visit if symptoms persist or worsen; I have alerted the patient to call if high fever, dehydration, marked weakness, fainting, increased abdominal pain, blood in stool or vomit.  Gayla Reed MD   "

## 2017-03-26 NOTE — PATIENT INSTRUCTIONS
Uncertain Causes of Diarrhea (Adult)    Diarrhea is when stools are loose and watery. This can be caused by:    Viral infections    Bacterial infections    Food poisoning    Parasites    Irritable bowel syndrome (IBS)    Inflammatory bowel diseases such as ulcerative colitis, Crohn's disease, and celiac disease    Food intolerance, such as to lactose, the sugar found in milk and milk products    Reaction to medicines like antibiotics, laxatives, cancer drugs, and antacids  Along with diarrhea, you may also have:    Abdominal pain and cramping    Nausea and vomiting    Loss of bowel control    Fever and chills    Bloody stools  In some cases, antibiotics may help to treat diarrhea. You may have a stool sample test. This is done to see what is causing your diarrhea, and if antibiotics will help treat it. The results of a stool sample test may take up to 2 days. The healthcare provider may not give you antibiotics until he or she has the stool test results.  Diarrhea can cause dehydration. This is the loss of too much water and other fluids from the body. When this occurs, body fluid must be replaced. This can be done with oral rehydration solutions. Oral rehydration solutions are available at drugstores and grocery stores without a prescription.  Home care  Follow all instructions given by your healthcare provider. Rest at home for the next 24 hours, or until you feel better. Avoid caffeine, tobacco, and alcohol. These can make diarrhea, cramping, and pain worse.  If taking medicines:    Don t take over-the-counter diarrhea or nausea medicines unless your healthcare provider tells you to.    You may use acetaminophen or NSAID medicines like ibuprofen or naproxen to reduce pain and fever. Don t use these if you have chronic liver or kidney disease, or ever had a stomach ulcer or gastrointestinal bleeding. Don't use NSAID medicines if you are already taking one for another condition (like arthritis) or are on daily  aspirin therapy (such as for heart disease or after a stroke). Talk with your healthcare provider first.    If antibiotics were prescribed, be sure you take them until they are finished. Don t stop taking them even when you feel better. Antibiotics must be taken as a full course.  To prevent the spread of illness:    Remember that washing with soap and water and using alcohol-based  is the best way to prevent the spread of infection.    Clean the toilet after each use.    Wash your hands before eating.    Wash your hands before and after preparing food. Keep in mind that people with diarrhea or vomiting should not prepare food for others.    Wash your hands after using cutting boards, countertops, and knives that have been in contact with raw foods.    Wash and then peel fruits and vegetables.    Keep uncooked meats away from cooked and ready-to-eat foods.    Use a food thermometer when cooking. Cook poultry to at least 165 F (74 C). Cook ground meat (beef, veal, pork, lamb) to at least 160 F (71 C). Cook fresh beef, veal, lamb, and pork to at least 145 F (63 C).    Don t eat raw or undercooked eggs (poached or linda side up), poultry, meat, or unpasteurized milk and juices.  Food and drinks  The main goal while treating vomiting or diarrhea is to prevent dehydration. This is done by taking small amounts of liquids often.    Keep in mind that liquids are more important than food right now.    Drink only small amounts of liquids at a time.    Don t force yourself to eat, especially if you are having cramping, vomiting, or diarrhea. Don t eat large amounts at a time, even if you are hungry.    If you eat, avoid fatty, greasy, spicy, or fried foods.    Don t eat dairy foods or drink milk if you have diarrhea. These can make diarrhea worse.  During the first 24 hours you can try:    Oral rehydration solutions. Do not use sports drinks. They have too much sugar and not enough electrolytes.    Soft drinks without  caffeine    Ginger ale    Water (plain or flavored)    Decaf tea or coffee    Clear broth, consommé, or bouillon    Gelatin, popsicles, or frozen fruit juice bars  The second 24 hours, if you are feeling better, you can add:    Hot cereal, plain toast, bread, rolls, or crackers    Plain noodles, rice, mashed potatoes, chicken noodle soup, or rice soup    Unsweetened canned fruit (no pineapple)    Bananas  As you recover:    Limit fat intake to less than 15 grams per day. Don t eat margarine, butter, oils, mayonnaise, sauces, gravies, fried foods, peanut butter, meat, poultry, or fish.    Limit fiber. Don t eat raw or cooked vegetables, fresh fruits except bananas, or bran cereals.    Limit caffeine and chocolate.    Limit dairy.    Don t use spices or seasonings except salt.    Go back to your normal diet over time, as you feel better and your symptoms improve.    If the symptoms come back, go back to a simple diet or clear liquids.  Follow-up care  Follow up with your healthcare provider, or as advised. If a stool sample was taken or cultures were done, call the healthcare provider for the results as instructed.  Call 911  Call 911 if you have any of these symptoms:    Trouble breathing    Confusion    Extreme drowsiness or trouble walking    Loss of consciousness    Rapid heart rate    Chest pain    Stiff neck    Seizure  When to seek medical advice  Call your healthcare provider right away if any of these occur:    Abdominal pain that gets worse    Constant lower right abdominal pain    Continued vomiting and inability to keep liquids down    Diarrhea more than 5 times a day    Blood in vomit or stool    Dark urine or no urine for 8 hours, dry mouth and tongue, tiredness, weakness, or dizziness    Drowsiness    New rash    You don t get better in 2 to 3 days    Fever of 100.4 F (38 C) or higher that doesn t get lower with medicine    1892-0936 [employer]. [address] [place] [address], [place] [address].  All rights reserved. This information is not intended as a substitute for professional medical care. Always follow your healthcare professional's instructions.        Treating Diarrhea  Diarrhea occurs when you have loose, watery, or frequent bowel movements. It is a common problem with many causes. Most cases of diarrhea clear up on their own. But certain cases may need treatment. Be sure to see your health care provider if your symptoms do not improve within a few days.    Getting Relief  Treatment of diarrhea depends on its cause. Diarrhea caused by bacterial or parasite infection is often treated with antibiotics. Diarrhea caused by other factors, such as a stomach virus, often improves with simple home treatment. The tips below may also help relieve your symptoms.    Drink plenty of fluids. This helps prevent too much fluid loss (dehydration). Water, clear soups, and electrolyte solutions are good choices. Avoid alcohol, coffee, tea, and milk. These can make symptoms worse.    Suck on ice chips if drinking makes you queasy.    Return to your normal diet slowly. You may want to eat bland foods at first, such as rice and toast. Also, you may need to avoid certain foods for a while, such as dairy products. These can make symptoms worse. Ask your health care provider if there are any other foods you should avoid.    If you were prescribed antibiotics, take them as directed.    Do not take anti-diarrhea medications without asking your health care provider first.  Call Your Health Care Provider If You Have:    Fever of 102 F (38.0 C) or higher    Severe pain    Worsening diarrhea or diarrhea for more than 2 days    Bloody vomit or stool    Signs of dehydration (dizziness, dry mouth and tongue, rapid pulse, dark urine)    7787-0807 [employer]. [address] [place] [address], [place] [address]. All rights reserved. This information is not intended as a substitute for professional medical care. Always follow your  healthcare professional's instructions.

## 2017-03-26 NOTE — NURSING NOTE
"Chief Complaint   Patient presents with     Urgent Care     URI     Diarrhea started last Sunday, has gotten worse during the week, continuing today (no blood); also feels nauseated, has had chills, bad headache, dizziness when he gets up; reports feels like food poisoning.  Taking sips of fluid, no food today.     Initial /78 (BP Location: Right arm, Cuff Size: Adult Regular)  Pulse 117  Temp 100.9  F (38.3  C) (Oral)  Ht 5' 9\" (1.753 m)  Wt 200 lb (90.7 kg)  SpO2 97%  BMI 29.53 kg/m2 Estimated body mass index is 29.53 kg/(m^2) as calculated from the following:    Height as of this encounter: 5' 9\" (1.753 m).    Weight as of this encounter: 200 lb (90.7 kg)..  BP completed using cuff size: regular  JAEL Cavazos  "

## 2017-03-27 ENCOUNTER — OFFICE VISIT (OUTPATIENT)
Dept: INTERNAL MEDICINE | Facility: CLINIC | Age: 27
End: 2017-03-27

## 2017-03-27 VITALS
HEART RATE: 82 BPM | DIASTOLIC BLOOD PRESSURE: 69 MMHG | BODY MASS INDEX: 29.76 KG/M2 | SYSTOLIC BLOOD PRESSURE: 121 MMHG | WEIGHT: 201.5 LBS

## 2017-03-27 DIAGNOSIS — F51.01 PRIMARY INSOMNIA: Primary | ICD-10-CM

## 2017-03-27 RX ORDER — LORAZEPAM 1 MG/1
2 TABLET ORAL
Qty: 30 TABLET | Refills: 0 | Status: SHIPPED | OUTPATIENT
Start: 2017-03-27 | End: 2017-05-31

## 2017-03-27 ASSESSMENT — PAIN SCALES - GENERAL: PAINLEVEL: NO PAIN (0)

## 2017-03-27 NOTE — PATIENT INSTRUCTIONS
Primary Care Center Medication Refill Request Information:  * Please contact your pharmacy regarding ANY request for medication refills.  ** [facility] Prescription Fax = [phone]  * Please allow 3 business days for routine medication refills.  * Please allow 5 business days for controlled substance medication refills.     Primary Care Center Test Result notification information:  *You will be notified with in 7-10 days of your appointment day regarding the results of your test.  If you are on MyChart you will be notified as soon as the provider has reviewed the results and signed off on them.    Try taking 1-3 mg of melatonin 30 min before going to bed.   Continue the magnesium before you go to sleep.

## 2017-03-27 NOTE — MR AVS SNAPSHOT
After Visit Summary   3/27/2017    Gabe Reddy    MRN: [id number]           Patient Information     Date Of Birth          1990        Visit Information        Provider Department      3/27/2017 5:00 PM Claudia Benavides MD [facility]        Today's Diagnoses     Primary insomnia    -  1      Care Instructions    Primary Care Center Medication Refill Request Information:  * Please contact your pharmacy regarding ANY request for medication refills.  ** [facility] Prescription Fax = [phone]  * Please allow 3 business days for routine medication refills.  * Please allow 5 business days for controlled substance medication refills.     Primary Care Center Test Result notification information:  *You will be notified with in 7-10 days of your appointment day regarding the results of your test.  If you are on MyChart you will be notified as soon as the provider has reviewed the results and signed off on them.    Try taking 1-3 mg of melatonin 30 min before going to bed.   Continue the magnesium before you go to sleep.           Follow-ups after your visit        Your next 10 appointments already scheduled     Apr 06, 2017  3:00 PM CDT   TELEMEDICINE with Sonal Elias RPH   [facility] ([facility])    [address] [place] [id number] [phone]           Note: this is not an onsite visit; there is no need to come to the facility.              Who to contact     Please call your clinic at [phone] to:    Ask questions about your health    Make or cancel appointments    Discuss your medicines    Learn about your test results    Speak to your doctor   If you have compliments or concerns about an experience at your clinic, or if you wish to file a complaint, please contact [facility] Patient Relations at [phone] or email us at  [email or web address]         Additional Information About Your Visit        [employer] Information     [employer] is an electronic gateway that provides easy, online access to your medical records. With [employer], you can request a clinic appointment, read your test results, renew a prescription or communicate with your care team.     To sign up for [employer] visit the website at [email or web address]   You will be asked to enter the access code listed below, as well as some personal information. Please follow the directions to create your username and password.     Your access code is: [email or web address]  Expires: 2017  7:30 AM     Your access code will  in 90 days. If you need help or a new code, please contact your [facility] or call [phone] for assistance.        Care EveryWhere ID     This is your Care EveryWhere ID. This could be used by other organizations to access your [facility] medical records  [id number]        Your Vitals Were     Pulse BMI (Body Mass Index)                82 29.76 kg/m2           Blood Pressure from Last 3 Encounters:   17 121/69   17 112/78   17 146/84    Weight from Last 3 Encounters:   17 91.4 kg (201 lb 8 oz)   17 90.7 kg (200 lb)   17 94 kg (207 lb 4.8 oz)              Today, you had the following     No orders found for display         Today's Medication Changes          These changes are accurate as of: 3/27/17  5:15 PM.  If you have any questions, ask your nurse or doctor.               These medicines have changed or have updated prescriptions.        Dose/Directions    LORazepam 1 MG tablet   Commonly known as:  ATIVAN   This may have changed:    - how much to take  - reasons to take this   Used for:  Primary insomnia   Changed by:  Claudia Benavides MD        Dose:  2 mg   Take 2 tablets (2 mg) by mouth nightly as needed (insomnia)   Quantity:  30 tablet   Refills:  0        omeprazole 20 MG CR capsule   Commonly known as:  priLOSEC   This may have changed:  when to take this   Used for:  Gastroesophageal reflux disease without esophagitis        Dose:  20 mg   Take 1 capsule (20 mg) by mouth daily as needed   Quantity:  90 capsule   Refills:  3         Stop taking these medicines if you haven't already. Please contact your care team if you have questions.     nicotine 14 MG/24HR 24 hr patch   Commonly known as:  NICODERM CQ   Stopped by:  Claudia Benavides MD                Where to get your medicines      Some of these will need a paper prescription and others can be bought over the counter.  Ask your nurse if you have questions.     Bring a paper prescription for each of these medications     LORazepam 1 MG tablet                Primary Care Provider Office Phone # Fax #    Claudia Lowe [phone]       [address] [place] [id number]        Thank you!     Thank you for choosing [facility]  for your care. Our goal is always to provide you with excellent care. Hearing back from our patients is one way we can continue to improve our services. Please take a few minutes to complete the written survey that you may receive in the mail after your visit with us. Thank you!             Your Updated Medication List - Protect others around you: Learn how to safely use, store and throw away your medicines at www.disposemymeds.org.          This list is accurate as of: 3/27/17  5:15 PM.  Always use your most recent med list.                   Brand Name Dispense Instructions for use    amphetamine-dextroamphetamine 20 MG per tablet    ADDERALL    90 tablet    Take 1 tablet (20 mg) by mouth 3 times daily as needed       LORazepam 1 MG tablet    ATIVAN    30 tablet    Take 2 tablets (2 mg) by mouth nightly as needed (insomnia)       omeprazole 20 MG CR capsule    priLOSEC    90 capsule    Take 1 capsule (20  mg) by mouth daily as needed       ondansetron 8 MG ODT tab    ZOFRAN ODT    9 tablet    Take 1 tablet (8 mg) by mouth 3 times daily (before meals)       varenicline 1 MG tablet    CHANTIX CONTINUING MONTH BREN    60 tablet    Take 1 tablet (1 mg) by mouth 2 times daily Take 1 tablet by mouth twice daily.

## 2017-03-27 NOTE — PROGRESS NOTES
S: Gabe is here for f/u on sleep. He notes he is doing quite a bit better. He has needed the ativan to help him sleep, especially the first few days after his last visit, but not as frequently now. He notes that 1mg was not effective but 2mg was. Of the 30 pills he received 1 mo ago, he has about 2-4 left. He is down to using it 1-2x/week. Also his work schedule has changed now, so working 10A-10P, which has allowed him to have a more regular sleep pattern. Also, he hasn't taken chantix in 4 days and still isn't smoking. He's not sure he's going to take this anymore and doesn't feel it's all that helpful. He is no longer using nicotine patches either, so he is off all nicotine.      O:   /69  Pulse 82  Wt 91.4 kg (201 lb 8 oz)  BMI 29.76 kg/m2  General: pleasant male, in NAD  Neuro: AOX3, no focal deficits  Psych: normal mood/affect    A/P:   Gabe was seen today for sleep problem.    Diagnoses and all orders for this visit:    Primary insomnia. Will refill this again, for 30 tabs. However, discussed that expect this should last for quite a while. If anything, it is acting as an emergency supply in the event he absolutely can't sleep again. Also discussed good sleep hygiene and provided a handout on this. Also discussed trying melatonin and magnesium 30 min prior to bed.   -     LORazepam (ATIVAN) 1 MG tablet; Take 2 tablets (2 mg) by mouth nightly as needed (insomnia)    >50% of this 15 min visit spent in pt education/counseling.     Claudia Lowe MD  3/27/17

## 2017-03-27 NOTE — NURSING NOTE
Chief Complaint   Patient presents with     Sleep Problem     Patient here for follow up of sleep problems.       Corry Cosme CMA at 4:55 PM on 3/27/2017.

## 2017-04-25 DIAGNOSIS — F90.2 ATTENTION DEFICIT HYPERACTIVITY DISORDER (ADHD), COMBINED TYPE: ICD-10-CM

## 2017-04-25 DIAGNOSIS — F90.9 ADULT ADHD: ICD-10-CM

## 2017-04-25 RX ORDER — DEXTROAMPHETAMINE SACCHARATE, AMPHETAMINE ASPARTATE, DEXTROAMPHETAMINE SULFATE AND AMPHETAMINE SULFATE 5; 5; 5; 5 MG/1; MG/1; MG/1; MG/1
20 TABLET ORAL 3 TIMES DAILY PRN
Qty: 90 TABLET | Refills: 0 | Status: SHIPPED | OUTPATIENT
Start: 2017-04-25 | End: 2017-05-25

## 2017-05-25 DIAGNOSIS — F90.9 ADULT ADHD: ICD-10-CM

## 2017-05-25 DIAGNOSIS — F90.2 ATTENTION DEFICIT HYPERACTIVITY DISORDER (ADHD), COMBINED TYPE: ICD-10-CM

## 2017-05-25 RX ORDER — DEXTROAMPHETAMINE SACCHARATE, AMPHETAMINE ASPARTATE, DEXTROAMPHETAMINE SULFATE AND AMPHETAMINE SULFATE 5; 5; 5; 5 MG/1; MG/1; MG/1; MG/1
20 TABLET ORAL 3 TIMES DAILY PRN
Qty: 90 TABLET | Refills: 0 | Status: SHIPPED | OUTPATIENT
Start: 2017-05-25 | End: 2017-06-23

## 2017-05-31 DIAGNOSIS — F51.01 PRIMARY INSOMNIA: ICD-10-CM

## 2017-06-01 RX ORDER — LORAZEPAM 1 MG/1
2 TABLET ORAL
Qty: 30 TABLET | Refills: 0 | Status: SHIPPED | OUTPATIENT
Start: 2017-06-01 | End: 2017-07-31

## 2017-06-02 ENCOUNTER — TELEPHONE (OUTPATIENT)
Dept: INTERNAL MEDICINE | Facility: CLINIC | Age: 27
End: 2017-06-02

## 2017-06-02 NOTE — TELEPHONE ENCOUNTER
"Pt called back , stated that he was seen in the [facility] ER and was given 1 dose of Xanax and advised him to follow up with the primary.  Pt said he very anxious and feel like\" My brain is not there\".  Pt said he was having trouble sleeping and was using\" GBL\" for a month and stopped using it a wk ago.  Pt denies having any thoughts about hurting himself or others.  Strongly advised pt to go to the [facility] for eval, pt verbalize understanding and agreed with the plan.  Sharon Roche RN  --------------------------           Call to pt, stated that he has increased anxiety and depression for the last 3 days and Lorazepam is not effective.  Pt said \"I am thinking about checking in to a hospital \".   Denies having any thoughts about hurting himself.  Advised pt to go to the [facility] ER, pt verbalize understanding and agreed with the plan.  Pt said he will go to the  hospital  Closer to home which is the [facility].  Sharon Roche RN  ---------------      ----- Message from Diane Doshi sent at 6/2/2017  9:13 AM CDT -----  Regarding: Discuss RX Ambien- Still no sleep: Dr. Jackson   Contact: [phone]  PT is requesting a call from Dr. Jackson to discuss starting RX Ambien. PT states he is still having a very hard time sleeping.    PT is using his friends phone because his is currently broken.  PT can be reached [phone].    Thank you!  Diane  Call Center     "

## 2017-06-23 DIAGNOSIS — F90.2 ATTENTION DEFICIT HYPERACTIVITY DISORDER (ADHD), COMBINED TYPE: ICD-10-CM

## 2017-06-23 DIAGNOSIS — F90.9 ADULT ADHD: ICD-10-CM

## 2017-06-23 RX ORDER — DEXTROAMPHETAMINE SACCHARATE, AMPHETAMINE ASPARTATE, DEXTROAMPHETAMINE SULFATE AND AMPHETAMINE SULFATE 5; 5; 5; 5 MG/1; MG/1; MG/1; MG/1
20 TABLET ORAL 3 TIMES DAILY PRN
Qty: 90 TABLET | Refills: 0 | Status: SHIPPED | OUTPATIENT
Start: 2017-06-23 | End: 2017-07-31

## 2017-06-23 NOTE — TELEPHONE ENCOUNTER
----- Message from Mel Cai sent at 6/23/2017  2:18 PM CDT -----  Regarding: Manuel Pt- Medication refill   Contact: [phone]  Alexsander Montoya requesting refill on amphetamine-dextroamphetamine (ADDERALL) 20 MG per tablet, pt is requesting this be mailed to the pharmacy which is [id number] - [place], [place] - [address] AT [facility] [address]     Thank you!     Mel  Call Center    Please DO NOT send this message and/or reply back to sender.  Call Center Representatives DO NOT respond to messages.

## 2017-06-29 ENCOUNTER — TELEPHONE (OUTPATIENT)
Dept: INTERNAL MEDICINE | Facility: CLINIC | Age: 27
End: 2017-06-29

## 2017-06-29 ENCOUNTER — OFFICE VISIT (OUTPATIENT)
Dept: INTERNAL MEDICINE | Facility: CLINIC | Age: 27
End: 2017-06-29

## 2017-06-29 VITALS
RESPIRATION RATE: 20 BRPM | HEART RATE: 85 BPM | WEIGHT: 209.8 LBS | SYSTOLIC BLOOD PRESSURE: 126 MMHG | OXYGEN SATURATION: 97 % | DIASTOLIC BLOOD PRESSURE: 86 MMHG | BODY MASS INDEX: 30.98 KG/M2

## 2017-06-29 DIAGNOSIS — F41.1 GAD (GENERALIZED ANXIETY DISORDER): Primary | ICD-10-CM

## 2017-06-29 DIAGNOSIS — F51.01 PRIMARY INSOMNIA: ICD-10-CM

## 2017-06-29 RX ORDER — ESCITALOPRAM OXALATE 20 MG/1
TABLET ORAL
Qty: 30 TABLET | Refills: 1 | Status: SHIPPED | OUTPATIENT
Start: 2017-06-29 | End: 2017-07-31

## 2017-06-29 RX ORDER — ZOLPIDEM TARTRATE 5 MG/1
5 TABLET ORAL
Qty: 15 TABLET | Refills: 0 | Status: SHIPPED | OUTPATIENT
Start: 2017-06-29 | End: 2017-07-31

## 2017-06-29 ASSESSMENT — PATIENT HEALTH QUESTIONNAIRE - PHQ9: 5. POOR APPETITE OR OVEREATING: NEARLY EVERY DAY

## 2017-06-29 ASSESSMENT — ANXIETY QUESTIONNAIRES
GAD7 TOTAL SCORE: 17
6. BECOMING EASILY ANNOYED OR IRRITABLE: NEARLY EVERY DAY
7. FEELING AFRAID AS IF SOMETHING AWFUL MIGHT HAPPEN: MORE THAN HALF THE DAYS
3. WORRYING TOO MUCH ABOUT DIFFERENT THINGS: NEARLY EVERY DAY
2. NOT BEING ABLE TO STOP OR CONTROL WORRYING: MORE THAN HALF THE DAYS
IF YOU CHECKED OFF ANY PROBLEMS ON THIS QUESTIONNAIRE, HOW DIFFICULT HAVE THESE PROBLEMS MADE IT FOR YOU TO DO YOUR WORK, TAKE CARE OF THINGS AT HOME, OR GET ALONG WITH OTHER PEOPLE: VERY DIFFICULT
5. BEING SO RESTLESS THAT IT IS HARD TO SIT STILL: SEVERAL DAYS
1. FEELING NERVOUS, ANXIOUS, OR ON EDGE: NEARLY EVERY DAY

## 2017-06-29 ASSESSMENT — PAIN SCALES - GENERAL: PAINLEVEL: NO PAIN (0)

## 2017-06-29 NOTE — TELEPHONE ENCOUNTER
Visit Activities (Refresh list every visit): phone call    I called Gabe at Dr Jackson's request to offer [facility] support/services. I had tried to see Gabe after his PCP appt today, but he had to leave for work, so I reached out to connect by phone to see where we might begin some support.    No answer. Left VM explaining who I am, why I am calling, and inviting him to call back.     VM included my phone number for return call: [phone].      Kenny Live MA, LMFT  Lead Behavioral Health Clinician  [facility]    [email or web address]       Phone: [phone] (mobility extension)  Pager: [phone]

## 2017-06-29 NOTE — NURSING NOTE
Chief Complaint   Patient presents with     Medication Follow-up     Patient is here to follow up on medication.      Kay Mcgowan LPN at 10:46 AM on 6/29/2017.

## 2017-06-29 NOTE — PATIENT INSTRUCTIONS
[facility] Medication Refill Request Information:  * Please contact your pharmacy regarding ANY request for medication refills.  ** [facility] Prescription Fax = [phone]  * Please allow 3 business days for routine medication refills.  * Please allow 5 business days for controlled substance medication refills.     [facility] Test Result notification information:  *You will be notified with in 7-10 days of your appointment day regarding the results of your test.  If you are on MyChart you will be notified as soon as the provider has reviewed the results and signed off on them.    [facility] [phone]

## 2017-06-29 NOTE — MR AVS SNAPSHOT
After Visit Summary   6/29/2017    Gabe Reddy    MRN: [id number]           Patient Information     Date Of Birth          1990        Visit Information        Provider Department      6/29/2017 10:30 AM Claudia Benavides MD [facility]        Today's Diagnoses     AMARIS (generalized anxiety disorder)    -  1    Primary insomnia          Care Instructions    Primary Care Center Medication Refill Request Information:  * Please contact your pharmacy regarding ANY request for medication refills.  ** [facility] Prescription Fax = [phone]  * Please allow 3 business days for routine medication refills.  * Please allow 5 business days for controlled substance medication refills.     Primary Care Center Test Result notification information:  *You will be notified with in 7-10 days of your appointment day regarding the results of your test.  If you are on MyChart you will be notified as soon as the provider has reviewed the results and signed off on them.    [facility] [phone]           Follow-ups after your visit        Follow-up notes from your care team     Return in about 4 weeks (around 7/27/2017) for Anxiety.      Your next 10 appointments already scheduled     Jul 31, 2017  6:30 PM CDT   (Arrive by 6:15 PM)   Return Visit with Claudia Lowe MD   [facility] ([facility])    [address] [place] [address] [phone]              Who to contact     Please call your clinic at [phone] to:    Ask questions about your health    Make or cancel appointments    Discuss your medicines    Learn about your test results    Speak to your doctor   If you have compliments or concerns about an experience at your clinic, or if you wish to file a complaint, please contact [facility] Patient Relations at [phone] or email us at  [email or web address]         Additional Information About Your Visit        Loxysoft Grouphart Information     [facility] is an electronic gateway that provides easy, online access to your medical records. With Thinkful, you can request a clinic appointment, read your test results, renew a prescription or communicate with your care team.     To sign up for Thinkful visit the website at [email or web address]   You will be asked to enter the access code listed below, as well as some personal information. Please follow the directions to create your username and password.     Your access code is: [email or web address]  Expires: 2017  8:43 AM     Your access code will  in 90 days. If you need help or a new code, please contact your [facility] or call [phone] for assistance.        Care EveryWhere ID     This is your Care EveryWhere ID. This could be used by other organizations to access your [place] medical records  [id number]        Your Vitals Were     Pulse Respirations Pulse Oximetry BMI (Body Mass Index)          85 20 97% 30.98 kg/m2         Blood Pressure from Last 3 Encounters:   17 126/86   17 121/69   17 112/78    Weight from Last 3 Encounters:   17 95.2 kg (209 lb 12.8 oz)   17 91.4 kg (201 lb 8 oz)   17 90.7 kg (200 lb)              Today, you had the following     No orders found for display         Today's Medication Changes          These changes are accurate as of: 17 11:17 AM.  If you have any questions, ask your nurse or doctor.               Start taking these medicines.        Dose/Directions    escitalopram 20 MG tablet   Commonly known as:  LEXAPRO   Used for:  AMARIS (generalized anxiety disorder)   Started by:  Claudia Benavides MD        Take 1/2 tablet (10 mg) for 1 week, then increase to 1 tablet orally daily   Quantity:  30 tablet   Refills:  1       zolpidem 5 MG tablet   Commonly known as:  AMBIEN    Used for:  Primary insomnia   Started by:  Claudia Benavides MD        Dose:  5 mg   Take 1 tablet (5 mg) by mouth nightly as needed for sleep   Quantity:  15 tablet   Refills:  0         These medicines have changed or have updated prescriptions.        Dose/Directions    omeprazole 20 MG CR capsule   Commonly known as:  priLOSEC   This may have changed:  when to take this   Used for:  Gastroesophageal reflux disease without esophagitis        Dose:  20 mg   Take 1 capsule (20 mg) by mouth daily as needed   Quantity:  90 capsule   Refills:  3            Where to get your medicines      These medications were sent to [id number] - [place], [place] - [address] AT [facility] [address]  [address], [place] [address]    Hours:  24-hours Phone:  [phone]     escitalopram 20 MG tablet         Some of these will need a paper prescription and others can be bought over the counter.  Ask your nurse if you have questions.     Bring a paper prescription for each of these medications     zolpidem 5 MG tablet                Primary Care Provider Office Phone # Fax #    Claudia Lowe [phone]       [address]  [facility] [address]        Equal Access to Services     LUCOI WADSWORTH AH: Hadii blanca banueloso Sokay, waaxda luqadaha, qaybta kaalmada adeegyada, craig barboza. So [facility] [phone].    ATENCIÓN: Si habla español, tiene a ames disposición servicios gratuitos de asistencia lingüística. Alfie al [phone].    We comply with applicable federal civil rights laws and [place] laws. We do not discriminate on the basis of race, color, national origin, age, disability sex, sexual orientation or gender identity.            Thank you!     Thank you for choosing [facility]  for your care. Our goal is always to provide you with excellent care. Hearing back from our  patients is one way we can continue to improve our services. Please take a few minutes to complete the written survey that you may receive in the mail after your visit with us. Thank you!             Your Updated Medication List - Protect others around you: Learn how to safely use, store and throw away your medicines at www.disposemymeds.org.          This list is accurate as of: 6/29/17 11:17 AM.  Always use your most recent med list.                   Brand Name Dispense Instructions for use Diagnosis    amphetamine-dextroamphetamine 20 MG per tablet    ADDERALL    90 tablet    Take 1 tablet (20 mg) by mouth 3 times daily as needed    Adult ADHD, Attention deficit hyperactivity disorder (ADHD), combined type       escitalopram 20 MG tablet    LEXAPRO    30 tablet    Take 1/2 tablet (10 mg) for 1 week, then increase to 1 tablet orally daily    AMARIS (generalized anxiety disorder)       LORazepam 1 MG tablet    ATIVAN    30 tablet    Take 2 tablets (2 mg) by mouth nightly as needed (insomnia)    Primary insomnia       omeprazole 20 MG CR capsule    priLOSEC    90 capsule    Take 1 capsule (20 mg) by mouth daily as needed    Gastroesophageal reflux disease without esophagitis       ondansetron 8 MG ODT tab    ZOFRAN ODT    9 tablet    Take 1 tablet (8 mg) by mouth 3 times daily (before meals)    Nausea       varenicline 1 MG tablet    CHANTIX CONTINUING MONTH BREN    60 tablet    Take 1 tablet (1 mg) by mouth 2 times daily Take 1 tablet by mouth twice daily.    Tobacco use disorder       zolpidem 5 MG tablet    AMBIEN    15 tablet    Take 1 tablet (5 mg) by mouth nightly as needed for sleep    Primary insomnia

## 2017-06-30 ASSESSMENT — PATIENT HEALTH QUESTIONNAIRE - PHQ9: SUM OF ALL RESPONSES TO PHQ QUESTIONS 1-9: 15

## 2017-06-30 ASSESSMENT — ANXIETY QUESTIONNAIRES: GAD7 TOTAL SCORE: 17

## 2017-07-10 ENCOUNTER — TELEPHONE (OUTPATIENT)
Dept: INTERNAL MEDICINE | Facility: CLINIC | Age: 27
End: 2017-07-10

## 2017-07-10 DIAGNOSIS — F51.01 PRIMARY INSOMNIA: Primary | ICD-10-CM

## 2017-07-10 NOTE — TELEPHONE ENCOUNTER
Spoke with pt, stated that the Rx Ambien is not effective , pt is getting only 2 hrs of sleep.  Advised pt to try for few more days to see the effectiveness, advised to call back .  Wondering if there another Rx he can try.  Please advise.  Sharon Roche RN  --------------------------        ----- Message from Bucky Martines sent at 7/6/2017  4:01 PM CDT -----  Regarding: Pt been on Ambien for 5-6 days and it's not working at all per Pt  Contact: [phone]  Pt has been on Ambien for 5-6 days and it's not working at all - Please call to see about trying another med - Pt not sleeping at all - [phone]      Thank You  [facility]

## 2017-07-17 RX ORDER — ESZOPICLONE 2 MG/1
2 TABLET, FILM COATED ORAL AT BEDTIME
Qty: 30 TABLET | Refills: 0 | Status: SHIPPED | OUTPATIENT
Start: 2017-07-17 | End: 2017-07-31

## 2017-07-18 NOTE — TELEPHONE ENCOUNTER
Rx Lunesta faxed to [facility].  Message left for pt regarding the new Rx, advised pt to stop the Ambien and do not take both.  Sharon Roche RN

## 2017-07-30 DIAGNOSIS — F41.1 GAD (GENERALIZED ANXIETY DISORDER): ICD-10-CM

## 2017-07-31 ENCOUNTER — OFFICE VISIT (OUTPATIENT)
Dept: INTERNAL MEDICINE | Facility: CLINIC | Age: 27
End: 2017-07-31

## 2017-07-31 VITALS
DIASTOLIC BLOOD PRESSURE: 85 MMHG | WEIGHT: 205.4 LBS | HEART RATE: 96 BPM | SYSTOLIC BLOOD PRESSURE: 127 MMHG | BODY MASS INDEX: 30.33 KG/M2

## 2017-07-31 DIAGNOSIS — F90.9 ADULT ADHD: ICD-10-CM

## 2017-07-31 DIAGNOSIS — F41.1 GAD (GENERALIZED ANXIETY DISORDER): ICD-10-CM

## 2017-07-31 DIAGNOSIS — F90.2 ATTENTION DEFICIT HYPERACTIVITY DISORDER (ADHD), COMBINED TYPE: ICD-10-CM

## 2017-07-31 DIAGNOSIS — F51.01 PRIMARY INSOMNIA: ICD-10-CM

## 2017-07-31 RX ORDER — DEXTROAMPHETAMINE SACCHARATE, AMPHETAMINE ASPARTATE, DEXTROAMPHETAMINE SULFATE AND AMPHETAMINE SULFATE 5; 5; 5; 5 MG/1; MG/1; MG/1; MG/1
20 TABLET ORAL 3 TIMES DAILY PRN
Qty: 90 TABLET | Refills: 0 | Status: SHIPPED | OUTPATIENT
Start: 2017-07-31 | End: 2017-08-29

## 2017-07-31 RX ORDER — GABAPENTIN 300 MG/1
CAPSULE ORAL
Qty: 120 CAPSULE | Refills: 1 | Status: SHIPPED | OUTPATIENT
Start: 2017-07-31 | End: 2017-10-17

## 2017-07-31 RX ORDER — ESZOPICLONE 2 MG/1
2 TABLET, FILM COATED ORAL AT BEDTIME
Qty: 30 TABLET | Refills: 0 | Status: SHIPPED | OUTPATIENT
Start: 2017-07-31 | End: 2017-07-31

## 2017-07-31 RX ORDER — LORAZEPAM 1 MG/1
2 TABLET ORAL
Qty: 30 TABLET | Refills: 0 | Status: SHIPPED | OUTPATIENT
Start: 2017-07-31 | End: 2017-09-18

## 2017-07-31 RX ORDER — ESCITALOPRAM OXALATE 20 MG/1
20 TABLET ORAL DAILY
Qty: 90 TABLET | Refills: 3 | Status: SHIPPED | OUTPATIENT
Start: 2017-07-31 | End: 2018-03-05

## 2017-07-31 ASSESSMENT — ANXIETY QUESTIONNAIRES
1. FEELING NERVOUS, ANXIOUS, OR ON EDGE: MORE THAN HALF THE DAYS
2. NOT BEING ABLE TO STOP OR CONTROL WORRYING: MORE THAN HALF THE DAYS
GAD7 TOTAL SCORE: 8
3. WORRYING TOO MUCH ABOUT DIFFERENT THINGS: SEVERAL DAYS
IF YOU CHECKED OFF ANY PROBLEMS ON THIS QUESTIONNAIRE, HOW DIFFICULT HAVE THESE PROBLEMS MADE IT FOR YOU TO DO YOUR WORK, TAKE CARE OF THINGS AT HOME, OR GET ALONG WITH OTHER PEOPLE: SOMEWHAT DIFFICULT
6. BECOMING EASILY ANNOYED OR IRRITABLE: SEVERAL DAYS
7. FEELING AFRAID AS IF SOMETHING AWFUL MIGHT HAPPEN: SEVERAL DAYS
5. BEING SO RESTLESS THAT IT IS HARD TO SIT STILL: NOT AT ALL

## 2017-07-31 ASSESSMENT — PATIENT HEALTH QUESTIONNAIRE - PHQ9: 5. POOR APPETITE OR OVEREATING: SEVERAL DAYS

## 2017-07-31 ASSESSMENT — PAIN SCALES - GENERAL: PAINLEVEL: NO PAIN (0)

## 2017-07-31 NOTE — PROGRESS NOTES
PRIMARY CARE CENTER       SUBJECTIVE:  Gabe Reddy is a 26 year old male who comes in for f/u on anxiety. Gabe notes that his anxiety is better with the start of Lexapro. He is having fewer days and when she's anxious, however he will still have spurts of 2-3 days of time and when she is anxious. He also has not having as many episodes of feeling panicked. However he is still struggling with insomnia. He tried Ambien, but did not find to be helpful, and also did not like the way that this made him feel. We had tried to give him Lunesta, but he was never able to get this prescription. He also notes that he would like to try something that is not related to the Ambien. He has run out of Ativan since his last appointment. He did get his last prescription on June 1. He uses this primarily at night before going to sleep if he absolutely cannot get to sleep.        Medications and allergies reviewed by me today.     ROS:   Constitutional, HEENT, cardiovascular, pulmonary, gi and gu systems are negative, except as otherwise noted.      OBJECTIVE:  /85  Pulse 96  Wt 93.2 kg (205 lb 6.4 oz)  BMI 30.33 kg/m2   Wt Readings from Last 1 Encounters:   07/31/17 93.2 kg (205 lb 6.4 oz)       GENERAL APPEARANCE: healthy, alert and no distress      PSYCH: mentation appears normal, anxious and judgment and insight intact     AMARIS-7 SCORE 6/29/2017 7/31/2017   Total Score 17 8         ASSESSMENT/PLAN:    Gabe was seen today for anxiety. For his anxiety, we will continue the Lexapro at its current dose. However I will go ahead and add gabapentin, to see if we can get better control of his anxiety. I also am hopeful that we can get better sleep by having him take a higher dose of gabapentin at night. To this end, I'm going to have him increase gabapentin 900 mg at night, with an additional 300 mg p.r.n. dose throughout the day. I will also refill a small amount of Ativan, so that he can have this in the  event that he has significant panic or complete inability to sleep. He will follow-up with me in 4 weeks.    Diagnoses and all orders for this visit:    Primary insomnia  -     LORazepam (ATIVAN) 1 MG tablet; Take 2 tablets (2 mg) by mouth nightly as needed (insomnia)    AMARIS (generalized anxiety disorder)  -     escitalopram (LEXAPRO) 20 MG tablet; Take 1 tablet (20 mg) by mouth daily  -     gabapentin (NEURONTIN) 300 MG capsule; Take 300 mg at night x3 nights, then 600 mg at night x3 nights, then 900 mg at night. Also 300 mg during the day As needed for anxiety.    Adult ADHD  -     amphetamine-dextroamphetamine (ADDERALL) 20 MG per tablet; Take 1 tablet (20 mg) by mouth 3 times daily as needed    Attention deficit hyperactivity disorder (ADHD), combined type  -     amphetamine-dextroamphetamine (ADDERALL) 20 MG per tablet; Take 1 tablet (20 mg) by mouth 3 times daily as needed         Pt should return to clinic for f/u with me in 4 weeks.      Claudia Lowe MD  08/01/17

## 2017-07-31 NOTE — PATIENT INSTRUCTIONS
[facility] Medication Refill Request Information:  * Please contact your pharmacy regarding ANY request for medication refills.  ** [facility] Prescription Fax = [phone]  * Please allow 3 business days for routine medication refills.  * Please allow 5 business days for controlled substance medication refills.     [facility] Test Result notification information:  *You will be notified with in 7-10 days of your appointment day regarding the results of your test.  If you are on MyChart you will be notified as soon as the provider has reviewed the results and signed off on them.    [facility] [phone]

## 2017-07-31 NOTE — MR AVS SNAPSHOT
After Visit Summary   7/31/2017    Gabe Reddy    MRN: [id number]           Patient Information     Date Of Birth          1990        Visit Information        Provider Department      7/31/2017 6:30 PM Claudia Benavides MD [facility]        Today's Diagnoses     Primary insomnia        AMARIS (generalized anxiety disorder)        Adult ADHD        Attention deficit hyperactivity disorder (ADHD), combined type          Care Instructions    Primary Care Center Medication Refill Request Information:  * Please contact your pharmacy regarding ANY request for medication refills.  ** [facility] Prescription Fax = [phone]  * Please allow 3 business days for routine medication refills.  * Please allow 5 business days for controlled substance medication refills.     Primary Care Center Test Result notification information:  *You will be notified with in 7-10 days of your appointment day regarding the results of your test.  If you are on MyChart you will be notified as soon as the provider has reviewed the results and signed off on them.    Primary Care Center [phone]             Follow-ups after your visit        Your next 10 appointments already scheduled     Aug 28, 2017  6:30 PM CDT   (Arrive by 6:15 PM)   Return Visit with Claudia Lowe MD   [facility] ([facility])    [address] [place] [address] [phone]              Who to contact     Please call your clinic at [phone] to:    Ask questions about your health    Make or cancel appointments    Discuss your medicines    Learn about your test results    Speak to your doctor   If you have compliments or concerns about an experience at your clinic, or if you wish to file a complaint, please contact [facility] Patient Relations at [phone] or email us at [email or web address]          Additional Information About Your Visit        WillCall Information     WillCall is an electronic gateway that provides easy, online access to your medical records. With WillCall, you can request a clinic appointment, read your test results, renew a prescription or communicate with your care team.     To sign up for WillCall visit the website at [email or web address]   You will be asked to enter the access code listed below, as well as some personal information. Please follow the directions to create your username and password.     Your access code is: [email or web address]  Expires: 2017  8:43 AM     Your access code will  in 90 days. If you need help or a new code, please contact your [facility] or call [phone] for assistance.        Care EveryWhere ID     This is your Care EveryWhere ID. This could be used by other organizations to access your [place] medical records  [id number]        Your Vitals Were     Pulse BMI (Body Mass Index)                96 30.33 kg/m2           Blood Pressure from Last 3 Encounters:   17 127/85   17 126/86   17 121/69    Weight from Last 3 Encounters:   17 93.2 kg (205 lb 6.4 oz)   17 95.2 kg (209 lb 12.8 oz)   17 91.4 kg (201 lb 8 oz)              Today, you had the following     No orders found for display         Today's Medication Changes          These changes are accurate as of: 17  6:55 PM.  If you have any questions, ask your nurse or doctor.               Start taking these medicines.        Dose/Directions    gabapentin 300 MG capsule   Commonly known as:  NEURONTIN   Used for:  AMARIS (generalized anxiety disorder)   Started by:  Claudia Benavides MD        Take 300 mg at night x3 nights, then 600 mg at night x3 nights, then 900 mg at night. Also 300 mg during the day As needed for anxiety.   Quantity:  120 capsule   Refills:  1         These medicines have changed or have updated  prescriptions.        Dose/Directions    escitalopram 20 MG tablet   Commonly known as:  LEXAPRO   This may have changed:    - how much to take  - how to take this  - when to take this  - additional instructions   Used for:  AMARIS (generalized anxiety disorder)   Changed by:  Claudia Benavides MD        Dose:  20 mg   Take 1 tablet (20 mg) by mouth daily   Quantity:  90 tablet   Refills:  3       omeprazole 20 MG CR capsule   Commonly known as:  priLOSEC   This may have changed:  when to take this   Used for:  Gastroesophageal reflux disease without esophagitis        Dose:  20 mg   Take 1 capsule (20 mg) by mouth daily as needed   Quantity:  90 capsule   Refills:  3         Stop taking these medicines if you haven't already. Please contact your care team if you have questions.     eszopiclone 2 MG tablet   Commonly known as:  LUNESTA   Stopped by:  Claudia Benavides MD           varenicline 1 MG tablet   Commonly known as:  CHANTIX CONTINUING MONTH BREN   Stopped by:  Claudia Benavides MD           zolpidem 5 MG tablet   Commonly known as:  AMBIEN   Stopped by:  Claudia Benavides MD                Where to get your medicines      These medications were sent to [id number] - [place], [place] - [address] AT [facility] [address]  [facility] [address] [place] [address]    Hours:  24-hours Phone:  [phone]     escitalopram 20 MG tablet    gabapentin 300 MG capsule         Some of these will need a paper prescription and others can be bought over the counter.  Ask your nurse if you have questions.     Bring a paper prescription for each of these medications     amphetamine-dextroamphetamine 20 MG per tablet    LORazepam 1 MG tablet                Primary Care Provider Office Phone # Fax #    Claudia Lowe [phone]       [address] [place] [address]        Equal Access to  Services     : Hadii aad ku hadscottiecy Lisbetkay, waaxda luqadaha, qaybta kaalmada josie, craig ortega . So [facility] [phone].    ATENCIÓN: Si habla adela, tiene a ames disposición servicios gratuitos de asistencia lingüística. Llame al [phone].    We comply with applicable federal civil rights laws and [place] laws. We do not discriminate on the basis of race, color, national origin, age, disability sex, sexual orientation or gender identity.            Thank you!     Thank you for choosing [facility]  for your care. Our goal is always to provide you with excellent care. Hearing back from our patients is one way we can continue to improve our services. Please take a few minutes to complete the written survey that you may receive in the mail after your visit with us. Thank you!             Your Updated Medication List - Protect others around you: Learn how to safely use, store and throw away your medicines at www.disposemymeds.org.          This list is accurate as of: 7/31/17  6:55 PM.  Always use your most recent med list.                   Brand Name Dispense Instructions for use Diagnosis    amphetamine-dextroamphetamine 20 MG per tablet    ADDERALL    90 tablet    Take 1 tablet (20 mg) by mouth 3 times daily as needed    Adult ADHD, Attention deficit hyperactivity disorder (ADHD), combined type       escitalopram 20 MG tablet    LEXAPRO    90 tablet    Take 1 tablet (20 mg) by mouth daily    AMARIS (generalized anxiety disorder)       gabapentin 300 MG capsule    NEURONTIN    120 capsule    Take 300 mg at night x3 nights, then 600 mg at night x3 nights, then 900 mg at night. Also 300 mg during the day As needed for anxiety.    AMARIS (generalized anxiety disorder)       LORazepam 1 MG tablet    ATIVAN    30 tablet    Take 2 tablets (2 mg) by mouth nightly as needed (insomnia)    Primary insomnia       omeprazole 20 MG CR capsule    priLOSEC    90  capsule    Take 1 capsule (20 mg) by mouth daily as needed    Gastroesophageal reflux disease without esophagitis       ondansetron 8 MG ODT tab    ZOFRAN ODT    9 tablet    Take 1 tablet (8 mg) by mouth 3 times daily (before meals)    Nausea

## 2017-07-31 NOTE — NURSING NOTE
Chief Complaint   Patient presents with     Anxiety     Patient here for anxiety follow up.      Corry Cosme CMA at 6:22 PM on 7/31/2017.

## 2017-08-01 RX ORDER — ESCITALOPRAM OXALATE 20 MG/1
TABLET ORAL
Qty: 30 TABLET | Refills: 0 | OUTPATIENT
Start: 2017-08-01

## 2017-08-01 ASSESSMENT — ANXIETY QUESTIONNAIRES: GAD7 TOTAL SCORE: 8

## 2017-08-29 DIAGNOSIS — F90.9 ADULT ADHD: ICD-10-CM

## 2017-08-29 DIAGNOSIS — F90.2 ATTENTION DEFICIT HYPERACTIVITY DISORDER (ADHD), COMBINED TYPE: ICD-10-CM

## 2017-08-29 RX ORDER — DEXTROAMPHETAMINE SACCHARATE, AMPHETAMINE ASPARTATE, DEXTROAMPHETAMINE SULFATE AND AMPHETAMINE SULFATE 5; 5; 5; 5 MG/1; MG/1; MG/1; MG/1
20 TABLET ORAL 3 TIMES DAILY PRN
Qty: 90 TABLET | Refills: 0 | Status: SHIPPED | OUTPATIENT
Start: 2017-08-29 | End: 2017-09-08

## 2017-08-30 NOTE — TELEPHONE ENCOUNTER
Rx mailed to pt's home address.  Sharon Roche RN  -------------  Pt called , stated that he still haven't received the Rx that was mailed out to him on 8/30/17.  New Rx issued and taken to the drop box,pt notified.  Sharon Roche RN

## 2017-09-08 RX ORDER — DEXTROAMPHETAMINE SACCHARATE, AMPHETAMINE ASPARTATE, DEXTROAMPHETAMINE SULFATE AND AMPHETAMINE SULFATE 5; 5; 5; 5 MG/1; MG/1; MG/1; MG/1
20 TABLET ORAL 3 TIMES DAILY PRN
Qty: 90 TABLET | Refills: 0 | Status: SHIPPED | OUTPATIENT
Start: 2017-09-08 | End: 2017-10-02

## 2017-09-16 DIAGNOSIS — K21.9 GASTROESOPHAGEAL REFLUX DISEASE WITHOUT ESOPHAGITIS: ICD-10-CM

## 2017-09-16 NOTE — TELEPHONE ENCOUNTER
omeprazole (PRILOSEC) 20 MG capsule    Last Written Prescription Date:  4/25/16  Last Fill Quantity: 90,   # refills: 3  Last Office Visit with [facility], [facility] or [facility] prescribing provider: 7/31/17  Future Office visit:   10/2/17

## 2017-09-18 DIAGNOSIS — F51.01 PRIMARY INSOMNIA: ICD-10-CM

## 2017-09-18 RX ORDER — LORAZEPAM 1 MG/1
2 TABLET ORAL
Qty: 30 TABLET | Refills: 0 | Status: SHIPPED | OUTPATIENT
Start: 2017-09-18 | End: 2018-06-22

## 2017-09-19 NOTE — PROGRESS NOTES
"                     PRIMARY CARE CENTER       SUBJECTIVE:  Gabe Reddy is a 26 year old male who comes in for f/u on insomnia and anxiety. He notes that ativan is no longer helping him to sleep at night. He is now having a hard time at work and even had a panic attack that required him to go to the ED. He is thinking about quitting his job ( at [employer]) b/c of anxiety.       Mood Symptoms     Concerns: Anxiety    How long have you been feeling this way? About 6 months    Description:   Depressed Mood?:No   Anxious Mood?:  YES Has been having symptoms almost every day before going to work. Gets nervous about hearing loud noises at work.     Accompanying Signs & Symptoms:  Still participating in activities that you used to enjoy?: No  Fatigue:  YES   Irritability:  YES, more \"sassy\" at work   Difficulty concentrating: No   Changes in appetite: No   Problems with sleep:  YES Trouble getting to sleep and staying asleep  ++++++++++++++++++++++++++++++++      Substance Use: No        Alcohol Use:rare                Other drug use:  Never Used    Social Support           Who do you live with? Roommate          Who do you turn to for support? Best friend, parents         Resources         What makes you feel better?: talking to friend helps         What do you do to manage stress? Lays in room, tries to distract himself             History  Has there been a time in your life when you needed much less sleep than usual? No   Heart racing/beating fast :  YES   Thoughts of hurting yourself or others: No   Previous treatment Antidepressants - ativan, escitalopram (Lexapro), queitapine (Seroquel) and sertraline (Zoloft)-not helpful                                  Therapy: No  Today's PHQ-9 Score:   No flowsheet data found.    AMARIS Score:  No flowsheet data found.         Medications and allergies reviewed by me today.     ROS:   Constitutional, HEENT, cardiovascular, pulmonary, gi and gu systems are negative, except as " otherwise noted.    OBJECTIVE:    /86  Pulse 85  Resp 20  Wt 95.2 kg (209 lb 12.8 oz)  SpO2 97%  BMI 30.98 kg/m2   Wt Readings from Last 1 Encounters:   06/29/17 95.2 kg (209 lb 12.8 oz)       GENERAL APPEARANCE: healthy, alert and no distress     PSYCH: mentation appears normal, affect flat, anxious and worried     ASSESSMENT/PLAN:    Gabe was seen today for medication follow-up.    Diagnoses and all orders for this visit:    AMARIS (generalized anxiety disorder). Will go ahead and start lexapro for anxiety, panic. Have him start seeing [facility] RTC in 4 wks.   -     escitalopram (LEXAPRO) 20 MG tablet; Take 1/2 tablet (10 mg) for 1 week, then increase to 1 tablet orally daily    Primary insomnia  -     zolpidem (AMBIEN) 5 MG tablet; Take 1 tablet (5 mg) by mouth nightly as needed for sleep       Pt should return to clinic for f/u with me in 4 weeks.      Claudia Lowe MD  06/29/17     Family/Patient

## 2017-10-02 DIAGNOSIS — F90.9 ADULT ADHD: ICD-10-CM

## 2017-10-02 DIAGNOSIS — F90.2 ATTENTION DEFICIT HYPERACTIVITY DISORDER (ADHD), COMBINED TYPE: ICD-10-CM

## 2017-10-02 RX ORDER — DEXTROAMPHETAMINE SACCHARATE, AMPHETAMINE ASPARTATE, DEXTROAMPHETAMINE SULFATE AND AMPHETAMINE SULFATE 5; 5; 5; 5 MG/1; MG/1; MG/1; MG/1
20 TABLET ORAL 3 TIMES DAILY PRN
Qty: 90 TABLET | Refills: 0 | Status: SHIPPED | OUTPATIENT
Start: 2017-10-07 | End: 2017-11-07

## 2017-10-17 DIAGNOSIS — F41.1 GAD (GENERALIZED ANXIETY DISORDER): ICD-10-CM

## 2017-10-18 NOTE — TELEPHONE ENCOUNTER
gabapentin       Last Written Prescription Date:  7/31/17  Last Fill Quantity: 120,   # refills: 1  Last Office Visit : 7/31/17  Future Office visit:  10/24/17  Routing refill request to provider for review/approval because:  LV  7/31/17  NO 4 WK F/U  RE NEW GABAPENTIN MED   APPT.  10/24/17

## 2017-10-19 RX ORDER — GABAPENTIN 300 MG/1
CAPSULE ORAL
Qty: 120 CAPSULE | Refills: 0 | Status: SHIPPED | OUTPATIENT
Start: 2017-10-19

## 2017-11-07 ENCOUNTER — TELEPHONE (OUTPATIENT)
Dept: INTERNAL MEDICINE | Facility: CLINIC | Age: 27
End: 2017-11-07

## 2017-11-07 DIAGNOSIS — F90.2 ATTENTION DEFICIT HYPERACTIVITY DISORDER (ADHD), COMBINED TYPE: ICD-10-CM

## 2017-11-07 DIAGNOSIS — F90.9 ADULT ADHD: ICD-10-CM

## 2017-11-07 RX ORDER — DEXTROAMPHETAMINE SACCHARATE, AMPHETAMINE ASPARTATE, DEXTROAMPHETAMINE SULFATE AND AMPHETAMINE SULFATE 5; 5; 5; 5 MG/1; MG/1; MG/1; MG/1
20 TABLET ORAL 3 TIMES DAILY PRN
Qty: 90 TABLET | Refills: 0 | Status: SHIPPED | OUTPATIENT
Start: 2017-11-07

## 2017-11-07 NOTE — TELEPHONE ENCOUNTER
Rx mailed to home.  -------------------------------------------      Pt called as he missed his last appt, due to a death in the family.  He did reschedule the appt to 12/5/17, Dr. Jackson's first available; however, he needs a refill of Adderall - 20mg.  Pt would like the prescription mailed to his home address.    Rx is pended. Thanks.    Dwight

## 2017-11-30 ENCOUNTER — TELEPHONE (OUTPATIENT)
Dept: INTERNAL MEDICINE | Facility: CLINIC | Age: 27
End: 2017-11-30

## 2017-11-30 NOTE — TELEPHONE ENCOUNTER
"Spoke with mother Stephy, stated that pt has been using the street drug called\" GHB\"since February and has been trying to get off it for the last 4 months.  Mother said Gabe is very depressed, lost his job and crying when ever she calls him.  Pt has an appt with  on 12/5/17 and Mother wants  to be aware of the situation.  Message forwarded to .  Sharon Roche RN        ----- Message from Bruna Garcia sent at 11/30/2017 11:06 AM CST -----  Regarding: Concerned mother  Pt's mom called in very concerned about her son. He has been taking GHB, and wanted to inform Dr. Jackson before his appointment. She is requesting a call back to discuss at [phone].    Thank you,    Bruna    Please DO NOT send this message and/or reply back to sender. Call Center Representatives DO NOT respond to messages.      "

## 2017-12-04 NOTE — TELEPHONE ENCOUNTER
Spoke with pt, very upset about his Mom's phone call to the clinic.  Pt will keep the appt tomorrow and will discuss the issues with primary .  Sharon Roche RN

## 2018-03-05 ENCOUNTER — HOSPITAL ENCOUNTER (EMERGENCY)
Facility: OTHER | Age: 28
Discharge: HOME | End: 2018-03-05
Attending: PHYSICIAN ASSISTANT | Admitting: PHYSICIAN ASSISTANT
Payer: MEDICAID

## 2018-03-05 VITALS
DIASTOLIC BLOOD PRESSURE: 80 MMHG | RESPIRATION RATE: 16 BRPM | TEMPERATURE: 97.6 F | HEART RATE: 98 BPM | OXYGEN SATURATION: 96 % | SYSTOLIC BLOOD PRESSURE: 100 MMHG | HEIGHT: 69 IN

## 2018-03-05 DIAGNOSIS — G44.319 ACUTE POST-TRAUMATIC HEADACHE, NOT INTRACTABLE: ICD-10-CM

## 2018-03-05 DIAGNOSIS — S01.01XA SCALP LACERATION, INITIAL ENCOUNTER: ICD-10-CM
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
## 2018-03-05 DIAGNOSIS — S09.90XA HEAD INJURY, INITIAL ENCOUNTER: ICD-10-CM

## 2018-03-05 PROCEDURE — 99282 EMERGENCY DEPT VISIT SF MDM: CPT | Mod: Z6 | Performed by: PHYSICIAN ASSISTANT

## 2018-03-05 PROCEDURE — 12001 RPR S/N/AX/GEN/TRNK 2.5CM/<: CPT | Performed by: PHYSICIAN ASSISTANT

## 2018-03-05 PROCEDURE — 99283 EMERGENCY DEPT VISIT LOW MDM: CPT | Mod: 25 | Performed by: PHYSICIAN ASSISTANT

## 2018-03-05 PROCEDURE — 25000132 ZZH RX MED GY IP 250 OP 250 PS 637: Performed by: PHYSICIAN ASSISTANT

## 2018-03-05 PROCEDURE — 25000125 ZZHC RX 250: Performed by: PHYSICIAN ASSISTANT

## 2018-03-05 PROCEDURE — 12001 RPR S/N/AX/GEN/TRNK 2.5CM/<: CPT | Mod: Z6 | Performed by: PHYSICIAN ASSISTANT

## 2018-03-05 RX ORDER — ONDANSETRON 4 MG/1
4 TABLET, ORALLY DISINTEGRATING ORAL ONCE
Status: COMPLETED | OUTPATIENT
Start: 2018-03-05 | End: 2018-03-05

## 2018-03-05 RX ORDER — HYDROCODONE BITARTRATE AND ACETAMINOPHEN 5; 325 MG/1; MG/1
1 TABLET ORAL ONCE
Status: COMPLETED | OUTPATIENT
Start: 2018-03-05 | End: 2018-03-05

## 2018-03-05 RX ORDER — HYDROCODONE BITARTRATE AND ACETAMINOPHEN 5; 325 MG/1; MG/1
1-2 TABLET ORAL EVERY 6 HOURS PRN
Qty: 15 TABLET | Refills: 0 | Status: SHIPPED | OUTPATIENT
Start: 2018-03-05 | End: 2018-06-22

## 2018-03-05 RX ORDER — LIDOCAINE HYDROCHLORIDE 10 MG/ML
5 INJECTION, SOLUTION INFILTRATION; PERINEURAL ONCE
Status: DISCONTINUED | OUTPATIENT
Start: 2018-03-05 | End: 2018-03-05 | Stop reason: HOSPADM

## 2018-03-05 RX ADMIN — HYDROCODONE BITARTRATE AND ACETAMINOPHEN 1 TABLET: 5; 325 TABLET ORAL at 18:45

## 2018-03-05 RX ADMIN — ONDANSETRON 4 MG: 4 TABLET, ORALLY DISINTEGRATING ORAL at 18:47

## 2018-03-05 ASSESSMENT — ENCOUNTER SYMPTOMS
ABDOMINAL PAIN: 0
COLOR CHANGE: 0
HEADACHES: 1
ARTHRALGIAS: 0
EYE REDNESS: 0
DIFFICULTY URINATING: 0
CHILLS: 0
CONFUSION: 0
NAUSEA: 1
SHORTNESS OF BREATH: 0
NECK STIFFNESS: 0
FEVER: 0

## 2018-03-05 NOTE — LETTER
[facility]  [address]  [place] [address]  Phone: [phone]  Fax: [phone]    March 5, 2018        Gabe Reddy  [address]  [place] [address]          To whom it may concern:    RE: Gabe Reddy    Patient was seen and treated today at our [facility].  He will need 2 days off from work until 3/7/18.  However he may return sooner if his symptoms improve    Please contact me for questions or concerns.      Sincerely,                Bucky Vaz MPA, PAMichaelC.

## 2018-03-05 NOTE — ED AVS SNAPSHOT
[facility]    [address]    [place] [address]    Phone:  [phone]    Fax:  [phone]                                       Gabe Reddy   MRN: [id number]    Department:  [facility]   Date of Visit:  3/5/2018           Patient Information     Date Of Birth          1990        Your diagnoses for this visit were:     Fall due to ice or snow, initial encounter     Scalp laceration, initial encounter     Acute post-traumatic headache, not intractable     Head injury, initial encounter        You were seen by Bucky Vaz PA-C.      Follow-up Information     Follow up with [facility] In 10 days.    Specialty:  EMERGENCY MEDICINE    Why:  For staple removal    Contact information:    [address]  [place] [address] [phone]        Discharge Instructions       Concussion Discharge Instructions  You were seen today for signs of a concussion. The symptoms will vary, depending on the nature of your injury and your health. You may have: headache, confusion, nausea (feel sick to your stomach), vomiting (throwing up) and problems with memory, concentrating or sleep. You may feel dizzy, irritable, and tired.   Children and teens may need help from their parents, teachers and coaches to watch for symptoms as they recover.  Follow-up  It is important for you to see a doctor for follow-up care to see how you are recovering. Please see your primary doctor within the next 5 to 7 days. You may have also been referred to the Concussion  service. They will contact you and arrange a follow-up visit if needed. If you need help sooner, you may call them at [phone].  Warning signs  Call your doctor or come back to Emergency if you suddenly have any of these symptoms:    Headaches that get worse    Feeling more and more drowsy    You keep repeating yourself    Strange  "behavior    Seizures    Repeat vomiting (throwing up)    Trouble walking    Growing confusion    Feeling more irritable    Neck pain that gets worse    Slurred speech    Weakness or numbness    Loss of consciousness    Fluid or blood coming from ears or nose  Self-care    Get lots of rest and get enough sleep at night. Take daytime naps or rest if you feel tired.    Limit physical activity and \"thinking\" activities. These can make symptoms worse.    Physical activity includes gym, sports, weight training, running, exercise and heavy lifting.    Thinking activities include homework, class work, job-related work and screen time (phone, computer, tablet, TV and video games).    Stick to a healthy diet and drink lots of fluids.    As symptoms improve, you may slowly return to your daily activities. If symptoms get worse   or return, reduce your activity.    Know that it is normal to feel sad and frustrated when you do not feel right and are less active.  Going back to work    Your care team will tell you when you are ready to return to work.    Limit the amount of work you do soon after your injury. This may speed healing. Take breaks if your symptoms get worse. You should also reduce your physical activity as well as activities that require a lot of thinking until you see your doctor.    You may need shorter work days and a lighter workload.    Avoid heavy lifting, working with machinery, driving and working at heights until your symptoms are gone or you are cleared by a doctor.  Returning to sports    Never return to play if you have any symptoms. A full recovery will reduce the chances of getting hurt again. Remember, it is better to miss one or two games than a whole season.    You should rest from all physical activity until you see your doctor. Generally, if all symptoms have completely cleared, your doctor can help guide you to slowly return to sports. If symptoms return or worsen, stop the activity and see your " "doctor.    Important: If you are in an organized sport and under age 18, you will need written consent from a healthcare provider before you return to sports. Typically, this will be your primary care or sports medicine doctor. Please make an appointment.  Going back to school    If you are still having symptoms, you may need extra help at school.    Tell your teachers and school nurse about your injury and symptoms. Ask them to watch for problems with learning, memory and concentrating. Symptoms may get worse when you do schoolwork, and you may become more irritable.    You may need shorter school days, a reduced workload, and to postpone testing.    Do not drive or take gym class (physical activity) until cleared by a doctor.  For informational purposes only. Not to replace the advice of your health care provider.   2009 [employer]. Used with permission. This form is adapted from the \"Heads Up: Brain Injury in Your Practice\" tool kit developed by the Centers for Disease Control and Prevention (CDC). All rights reserved. [employer]. [employer] [id number] - Rev 03/17.      Discharge References/Attachments     MILD TRAUMATIC BRAIN INJURY (CONCUSSION)? WHAT IS (ENGLISH)    TRAUMATIC BRAIN INJURY (HEAD TRAUMA) (ENGLISH)      24 Hour Appointment Hotline       To make an appointment at any [facility] clinic, call [phone] ([phone]). If you don't have a family doctor or clinic, we will help you find one. [facility] clinics are conveniently located to serve the needs of you and your family.             Review of your medicines      START taking        Dose / Directions Last dose taken    HYDROcodone-acetaminophen 5-325 MG per tablet   Commonly known as:  NORCO   Dose:  1-2 tablet   Quantity:  15 tablet        Take 1-2 tablets by mouth every 6 hours as needed for moderate to severe pain   Refills:  0          Our records show that you are taking the medicines listed " below. If these are incorrect, please call your family doctor or clinic.        Dose / Directions Last dose taken    amphetamine-dextroamphetamine 20 MG per tablet   Commonly known as:  ADDERALL   Dose:  20 mg   Quantity:  90 tablet        Take 1 tablet (20 mg) by mouth 3 times daily as needed   Refills:  0        gabapentin 300 MG capsule   Commonly known as:  NEURONTIN   Quantity:  120 capsule        Take 3 caps (900 mg) at night and take 1 capsule during the day.   Refills:  0        LORazepam 1 MG tablet   Commonly known as:  ATIVAN   Dose:  2 mg   Quantity:  30 tablet        Take 2 tablets (2 mg) by mouth nightly as needed (insomnia)   Refills:  0        omeprazole 20 MG CR capsule   Commonly known as:  priLOSEC   Dose:  20 mg   Quantity:  90 capsule        Take 1 capsule (20 mg) by mouth daily as needed   Refills:  3        ondansetron 8 MG ODT tab   Commonly known as:  ZOFRAN ODT   Dose:  8 mg   Quantity:  9 tablet        Take 1 tablet (8 mg) by mouth 3 times daily (before meals)   Refills:  0                Prescriptions were sent or printed at these locations (1 Prescription)                   Other Prescriptions                Printed at Department/Unit printer (1 of 1)         HYDROcodone-acetaminophen (NORCO) 5-325 MG per tablet                Orders Needing Specimen Collection     None      Pending Results     No orders found from 3/3/2018 to 3/6/2018.            Pending Culture Results     No orders found from 3/3/2018 to 3/6/2018.            Thank you for choosing [facility]       Thank you for choosing [facility] for your care. Our goal is always to provide you with excellent care. Hearing back from our patients is one way we can continue to improve our services. Please take a few minutes to complete the written survey that you may receive in the mail after you visit with us. Thank you!        [facility] Information     [employer] lets you send messages to your doctor, view your test results, renew your  "prescriptions, schedule appointments and more. To sign up, go to [email or web address] . Click on \"Log in\" on the left side of the screen, which will take you to the Welcome page. Then click on \"Sign up Now\" on the right side of the page.     You will be asked to enter the access code listed below, as well as some personal information. Please follow the directions to create your username and password.     Your access code is: [email or web address]  Expires: 6/3/2018  7:20 PM     Your access code will  in 90 days. If you need help or a new code, please call your [facility] clinic or [phone].        Care EveryWhere ID     This is your Care EveryWhere ID. This could be used by other organizations to access your [facility] medical records  [id number]        Equal Access to Services     LUCIO WADSWORTH : Tomasa Guzmán, daphne bernal, jessica galindo, craig ortega . So [facility] [phone].    ATENCIÓN: Si habla español, tiene a ames disposición servicios gratuitos de asistencia lingüística. Llame al [phone].    We comply with applicable federal civil rights laws and [place] laws. We do not discriminate on the basis of race, color, national origin, age, disability, sex, sexual orientation, or gender identity.            After Visit Summary       This is your record. Keep this with you and show to your community pharmacist(s) and doctor(s) at your next visit.                  "

## 2018-03-05 NOTE — ED AVS SNAPSHOT
[facility]    [address]    [place] [address]    Phone:  [phone]    Fax:  [phone]                                       Gabe Reddy   MRN: [id number]    Department:  [facility]   Date of Visit:  3/5/2018           After Visit Summary Signature Page     I have received my discharge instructions, and my questions have been answered. I have discussed any challenges I see with this plan with the nurse or doctor.    ..........................................................................................................................................  Patient/Patient Representative Signature      ..........................................................................................................................................  Patient Representative Print Name and Relationship to Patient    ..................................................               ................................................  Date                                            Time    ..........................................................................................................................................  Reviewed by Signature/Title    ...................................................              ..............................................  Date                                                            Time

## 2018-03-06 NOTE — ED NOTES
Pt was walking down a hill and slipped on ice, struck the back of his head and has a puncture wound that has bled quite a bit.  Did not have a LOC, but has a headache in the front of his head.  Denies visual disturbances or trouble with ambulating.  All other neuro signs stable.

## 2018-03-06 NOTE — ED PROVIDER NOTES
History     Chief Complaint   Patient presents with     Fall     Head Laceration     HPI Comments: This is a 28 yo male who was walking down a hill when he slipped on the ice and fell backwards hitting his posterior head. Denies any LOC but afterwards he felt a little dizzy with a frontal headache.  Was slightly nauseated initially as well but denies any emesis.     The history is provided by the patient.         Problem List:    Patient Active Problem List    Diagnosis Date Noted     Esophageal reflux 04/25/2016     Priority: Medium     CARDIOVASCULAR SCREENING; LDL GOAL LESS THAN 160 06/21/2012     Priority: Medium     ADHD (attention deficit hyperactivity disorder)      Priority: Medium     Controlled substance agreement signed 4/25/16 Dr Moises Zavala          Past Medical History:    Past Medical History:   Diagnosis Date     ADHD (attention deficit hyperactivity disorder) 2003       Past Surgical History:    Past Surgical History:   Procedure Laterality Date     HERNIA REPAIR  1996       Family History:    Family History   Problem Relation Age of Onset     HEART DISEASE Father      Thyroid Disease Father      Type 2 Diabetes Father      Attention Deficit Disorder Maternal Grandmother        Social History:  Marital Status:  Single [1]  Social History   Substance Use Topics     Smoking status: Current Every Day Smoker     Packs/day: 0.75     Years: 7.00     Types: Cigarettes     Last attempt to quit: 2/12/2017     Smokeless tobacco: Never Used     Alcohol use 1.0 oz/week     2 Standard drinks or equivalent per week        Medications:      HYDROcodone-acetaminophen (NORCO) 5-325 MG per tablet   amphetamine-dextroamphetamine (ADDERALL) 20 MG per tablet   gabapentin (NEURONTIN) 300 MG capsule   LORazepam (ATIVAN) 1 MG tablet   omeprazole (PRILOSEC) 20 MG CR capsule   ondansetron (ZOFRAN ODT) 8 MG ODT tab         Review of Systems   Constitutional: Negative for chills and fever.   HENT: Negative for congestion.  "        Posterior scalp laceration   Eyes: Negative for redness.   Respiratory: Negative for shortness of breath.    Cardiovascular: Negative for chest pain.   Gastrointestinal: Positive for nausea. Negative for abdominal pain.   Genitourinary: Negative for difficulty urinating.   Musculoskeletal: Negative for arthralgias and neck stiffness.   Skin: Negative for color change.   Neurological: Positive for headaches.   Psychiatric/Behavioral: Negative for confusion.       Physical Exam   BP: (!) 127/97  Pulse: 92  Heart Rate: 80  Temp: 97.9  F (36.6  C)  Resp: 12  Height: 175.3 cm (5' 9\")  SpO2: 95 %      Physical Exam   Constitutional: He is oriented to person, place, and time. No distress.   HENT:   Head: Atraumatic.   Right Ear: No drainage. Tympanic membrane is not perforated. No middle ear effusion.   Left Ear: No drainage. Tympanic membrane is not perforated.  No middle ear effusion.   2 cm posterior scalp superficial scalp laceration.  Minimal bleeding at this time.    Eyes: EOM are normal. Pupils are equal, round, and reactive to light. Right eye exhibits normal extraocular motion and no nystagmus. Left eye exhibits normal extraocular motion and no nystagmus. Right pupil is round and reactive. Left pupil is round and reactive. Pupils are equal.   Neck: Normal range of motion and full passive range of motion without pain. Neck supple. No spinous process tenderness and no muscular tenderness present. No rigidity. Normal range of motion present.   Cardiovascular: Regular rhythm and normal heart sounds.    Pulmonary/Chest: Breath sounds normal. No respiratory distress. He exhibits no tenderness.   Abdominal: Soft. Bowel sounds are normal. There is no tenderness.   Musculoskeletal: Normal range of motion. He exhibits no tenderness.        Cervical back: He exhibits no tenderness.        Thoracic back: He exhibits no tenderness.        Lumbar back: He exhibits no tenderness.   Neurological: He is alert and oriented " to person, place, and time.   Skin: No abrasion and no laceration noted. He is not diaphoretic.       ED Course     ED Course     Laceration repair  Performed by: THAIS CEVALLOS  Authorized by: THAIS CEVALLOS   Consent: Verbal consent obtained. Written consent not obtained.  Risks and benefits: risks, benefits and alternatives were discussed  Consent given by: patient  Patient understanding: patient states understanding of the procedure being performed  Patient consent: the patient's understanding of the procedure matches consent given  Procedure consent: procedure consent matches procedure scheduled  Relevant documents: relevant documents present and verified  Test results: test results not available  Site marked: the operative site was not marked  Imaging studies: imaging studies not available  Patient identity confirmed: verbally with patient and hospital-assigned identification number  Time out called: not required.  Body area: head/neck  Location details: scalp  Laceration length: 2 cm  Foreign bodies: no foreign bodies  Tendon involvement: none  Nerve involvement: none  Vascular damage: no  Anesthesia: local infiltration    Anesthesia:  Local Anesthetic: lidocaine 1% without epinephrine  Anesthetic total: 2 mL    Sedation:  Patient sedated: no  Irrigation solution: saline  Irrigation method: syringe  Amount of cleaning: standard  Debridement: none  Degree of undermining: none  Skin closure: staples  Patient tolerance: Patient tolerated the procedure well with no immediate complications                     Labs Ordered and Resulted from Time of ED Arrival Up to the Time of Departure from the ED - No data to display  Medications   lidocaine 1 % injection 5 mL (not administered)   HYDROcodone-acetaminophen (NORCO) 5-325 MG per tablet 1 tablet (1 tablet Oral Given 3/5/18 1845)   ondansetron (ZOFRAN-ODT) ODT tab 4 mg (4 mg Oral Given 3/5/18 1847)       Assessments & Plan (with Medical Decision Making)     I  have reviewed the nursing notes.    I have reviewed the findings, diagnosis, plan and need for follow up with the patient.      Discharge Medication List as of 3/5/2018  7:32 PM      START taking these medications    Details   HYDROcodone-acetaminophen (NORCO) 5-325 MG per tablet Take 1-2 tablets by mouth every 6 hours as needed for moderate to severe pain, Disp-15 tablet, R-0, Local Print             Final diagnoses:   Fall due to ice or snow, initial encounter   Scalp laceration, initial encounter   Acute post-traumatic headache, not intractable   Head injury, initial encounter     Fall on ice with head injury and posterior scalp laceration .headache and some initial nausea.   He was given norco and zofran orally.   Laceration repaired as above.  Discussed Head injury and initially the patient was requesting a head CT.  But afterwards declined stating he would continue to monitor and return if he had any concerns.  Rx for norco #15.  Follow up in 10 days for staple removal.  Sooner if there are any concerns.        3/5/2018   [facility]     Bucky Vaz PA-C  03/05/18 2000

## 2018-03-06 NOTE — DISCHARGE INSTRUCTIONS
"Concussion Discharge Instructions  You were seen today for signs of a concussion. The symptoms will vary, depending on the nature of your injury and your health. You may have: headache, confusion, nausea (feel sick to your stomach), vomiting (throwing up) and problems with memory, concentrating or sleep. You may feel dizzy, irritable, and tired.   Children and teens may need help from their parents, teachers and coaches to watch for symptoms as they recover.  Follow-up  It is important for you to see a doctor for follow-up care to see how you are recovering. Please see your primary doctor within the next 5 to 7 days. You may have also been referred to the Concussion  service. They will contact you and arrange a follow-up visit if needed. If you need help sooner, you may call them at [phone].  Warning signs  Call your doctor or come back to Emergency if you suddenly have any of these symptoms:    Headaches that get worse    Feeling more and more drowsy    You keep repeating yourself    Strange behavior    Seizures    Repeat vomiting (throwing up)    Trouble walking    Growing confusion    Feeling more irritable    Neck pain that gets worse    Slurred speech    Weakness or numbness    Loss of consciousness    Fluid or blood coming from ears or nose  Self-care    Get lots of rest and get enough sleep at night. Take daytime naps or rest if you feel tired.    Limit physical activity and \"thinking\" activities. These can make symptoms worse.    Physical activity includes gym, sports, weight training, running, exercise and heavy lifting.    Thinking activities include homework, class work, job-related work and screen time (phone, computer, tablet, TV and video games).    Stick to a healthy diet and drink lots of fluids.    As symptoms improve, you may slowly return to your daily activities. If symptoms get worse   or return, reduce your activity.    Know that it is normal to feel sad and frustrated when you do " not feel right and are less active.  Going back to work    Your care team will tell you when you are ready to return to work.    Limit the amount of work you do soon after your injury. This may speed healing. Take breaks if your symptoms get worse. You should also reduce your physical activity as well as activities that require a lot of thinking until you see your doctor.    You may need shorter work days and a lighter workload.    Avoid heavy lifting, working with machinery, driving and working at heights until your symptoms are gone or you are cleared by a doctor.  Returning to sports    Never return to play if you have any symptoms. A full recovery will reduce the chances of getting hurt again. Remember, it is better to miss one or two games than a whole season.    You should rest from all physical activity until you see your doctor. Generally, if all symptoms have completely cleared, your doctor can help guide you to slowly return to sports. If symptoms return or worsen, stop the activity and see your doctor.    Important: If you are in an organized sport and under age 18, you will need written consent from a healthcare provider before you return to sports. Typically, this will be your primary care or sports medicine doctor. Please make an appointment.  Going back to school    If you are still having symptoms, you may need extra help at school.    Tell your teachers and school nurse about your injury and symptoms. Ask them to watch for problems with learning, memory and concentrating. Symptoms may get worse when you do schoolwork, and you may become more irritable.    You may need shorter school days, a reduced workload, and to postpone testing.    Do not drive or take gym class (physical activity) until cleared by a doctor.  For informational purposes only. Not to replace the advice of your health care provider.   2009 [employer]. Used with permission. This form is adapted  "from the \"Heads Up: Brain Injury in Your Practice\" tool kit developed by the Centers for Disease Control and Prevention (CDC). All rights reserved. [facility]. [employer] 842166fo - Rev 03/17.    "

## 2018-03-14 ENCOUNTER — HOSPITAL ENCOUNTER (EMERGENCY)
Facility: OTHER | Age: 28
Discharge: HOME | End: 2018-03-14
Payer: MEDICAID

## 2018-03-14 VITALS
OXYGEN SATURATION: 99 % | HEART RATE: 102 BPM | RESPIRATION RATE: 18 BRPM | TEMPERATURE: 97.2 F | BODY MASS INDEX: 28.14 KG/M2 | WEIGHT: 190 LBS | DIASTOLIC BLOOD PRESSURE: 82 MMHG | HEIGHT: 69 IN | SYSTOLIC BLOOD PRESSURE: 140 MMHG

## 2018-03-14 NOTE — ED NOTES
Pt here for staple removal. 4 staples removed without difficulty, wound is scabbed over and appears to be healing well. Pt tolerated well.

## 2018-06-22 ENCOUNTER — HOSPITAL ENCOUNTER (EMERGENCY)
Facility: OTHER | Age: 28
Discharge: HOME | End: 2018-06-22
Admitting: PHYSICIAN ASSISTANT
Payer: COMMERCIAL

## 2018-06-22 VITALS
SYSTOLIC BLOOD PRESSURE: 130 MMHG | HEIGHT: 69 IN | DIASTOLIC BLOOD PRESSURE: 89 MMHG | RESPIRATION RATE: 16 BRPM | WEIGHT: 210 LBS | TEMPERATURE: 98.6 F | HEART RATE: 115 BPM | OXYGEN SATURATION: 94 % | BODY MASS INDEX: 31.1 KG/M2

## 2018-06-22 DIAGNOSIS — L55.0 SUNBURN OF FIRST DEGREE: Primary | ICD-10-CM

## 2018-06-22 DIAGNOSIS — M79.89 SWELLING OF LIMB: ICD-10-CM

## 2018-06-22 LAB
ANION GAP SERPL CALCULATED.3IONS-SCNC: 7 MMOL/L (ref 3–14)
BUN SERPL-MCNC: 11 MG/DL (ref 7–25)
CALCIUM SERPL-MCNC: 9.5 MG/DL (ref 8.6–10.3)
CHLORIDE SERPL-SCNC: 103 MMOL/L (ref 98–107)
CO2 SERPL-SCNC: 27 MMOL/L (ref 21–31)
CREAT SERPL-MCNC: 0.78 MG/DL (ref 0.7–1.3)
GFR SERPL CREATININE-BSD FRML MDRD: >90 ML/MIN/1.7M2
GLUCOSE SERPL-MCNC: 117 MG/DL (ref 70–105)
POTASSIUM SERPL-SCNC: 3.5 MMOL/L (ref 3.5–5.1)
SODIUM SERPL-SCNC: 137 MMOL/L (ref 134–144)

## 2018-06-22 PROCEDURE — 99282 EMERGENCY DEPT VISIT SF MDM: CPT | Mod: Z6 | Performed by: PHYSICIAN ASSISTANT

## 2018-06-22 PROCEDURE — 36415 COLL VENOUS BLD VENIPUNCTURE: CPT | Performed by: PHYSICIAN ASSISTANT

## 2018-06-22 PROCEDURE — 25000132 ZZH RX MED GY IP 250 OP 250 PS 637: Performed by: PHYSICIAN ASSISTANT

## 2018-06-22 PROCEDURE — 99283 EMERGENCY DEPT VISIT LOW MDM: CPT | Performed by: PHYSICIAN ASSISTANT

## 2018-06-22 PROCEDURE — 80048 BASIC METABOLIC PNL TOTAL CA: CPT | Performed by: PHYSICIAN ASSISTANT

## 2018-06-22 RX ORDER — ACETAMINOPHEN 500 MG
1000 TABLET ORAL ONCE
Status: COMPLETED | OUTPATIENT
Start: 2018-06-22 | End: 2018-06-22

## 2018-06-22 RX ADMIN — ACETAMINOPHEN 1000 MG: 500 TABLET, FILM COATED ORAL at 16:44

## 2018-06-22 ASSESSMENT — ENCOUNTER SYMPTOMS
CONSTITUTIONAL NEGATIVE: 1
SHORTNESS OF BREATH: 0
VOMITING: 1
NAUSEA: 1
COLOR CHANGE: 1
ABDOMINAL PAIN: 0
DIARRHEA: 0

## 2018-06-22 NOTE — ED AVS SNAPSHOT
[facility]    [address]    [place] [address]    Phone:  [phone]    Fax:  [phone]                                       Gabe Reddy   MRN: [id number]    Department:  [facility]   Date of Visit:  6/22/2018           After Visit Summary Signature Page     I have received my discharge instructions, and my questions have been answered. I have discussed any challenges I see with this plan with the nurse or doctor.    ..........................................................................................................................................  Patient/Patient Representative Signature      ..........................................................................................................................................  Patient Representative Print Name and Relationship to Patient    ..................................................               ................................................  Date                                            Time    ..........................................................................................................................................  Reviewed by Signature/Title    ...................................................              ..............................................  Date                                                            Time
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
## 2018-06-22 NOTE — DISCHARGE INSTRUCTIONS
First-Degree Burn  A burn occurs when skin is exposed to too much heat, sun, or harsh chemicals. A first-degree burn (superficial burn) causes mainly redness. It heals in a few days.  Home care  Follow these guidelines when caring for yourself at home:    Use pain medicine as directed. You may use over-the-counter medicine to control pain if no pain medicine was prescribed. If you have chronic liver or kidney disease, talk with your healthcare provider before using acetaminophen or ibuprofen. Also talk with your provider if you've had a stomach ulcer or GI bleeding.    On the first day, you may put a cool compress on the burn to relieve pain. You can use a small towel soaked in cool water as a cool compress.    Numbing medicines for sunburn can be used for temporary relief of the burning feeling. These medicines include lidocaine or benzocaine. You don t need a prescription for them.    Moisturizers with aloe vera can help soothe the burn.    Don't pick or scratch at the affected areas. Use over-the-counter medicines like diphenhydramine for itching. This medicine is taken by mouth.    Since you don't have an open wound, you don't need to use antibiotic cream or ointment. Sometimes an infection may occur even with proper treatment. Be sure to check the burn daily for the signs of infection listed below.    Wear a hat, sunscreen, and long sleeves while in the sun.    Wear loose-fitting clothing until the burn heals.  Follow-up care  Follow up with your healthcare provider, or as advised. Most first-degree burns heal well without complications.  When to seek medical advice  Call your healthcare provider right away if any of these signs of infection occur:    Fever of 100.4 F (38 C) or higher, or as directed by your healthcare provider    Pain gets worse    Redness or swelling gets worse    Pus comes from the burn    Red streaks in your skin come from the burn    Wound doesn't appear to be healing  Date Last  Reviewed: 12/1/2016 2000-2017 [employer] [address] [place] [address], [place] [address]. All rights reserved. This information is not intended as a substitute for professional medical care. Always follow your healthcare professional's instructions.

## 2018-06-22 NOTE — ED AVS SNAPSHOT
"    [facility]: [phone]                                              INTERAGENCY TRANSFER FORM - LAB / IMAGING / EKG / EMG RESULTS   2018                    Hospital Admission Date: 2018  MYNOR CLAY   : 1990  Sex: Male        Attending Provider: (none)    Allergies:  Nkda [No Known Drug Allergies]    Infection:  None   Service:  EMERGENCY ME    Ht:  1.753 m (5' 9\")   Wt:  95.3 kg (210 lb)   Admission Wt:  95.3 kg (210 lb)    BMI:  31.01 kg/m 2   BSA:  2.15 m 2            Patient PCP Information     Provider PCP Type    Claudia Lowe MD General         Lab Results - 3 Days      Basic metabolic panel [[id number]] (Abnormal)  Resulted: 18 1657, Result status: Final result    Ordering provider: Kristen Burnette PA-C  18 1623 Resulting lab: [facility]    Specimen Information    Type Source Collected On   Blood  18 1630          Components       Value Reference Range Flag Lab   Sodium 137 134 - 144 mmol/L  [facility]   Potassium 3.5 3.5 - 5.1 mmol/L  [facility]   Chloride 103 98 - 107 mmol/L  [facility]   Carbon Dioxide 27 21 - 31 mmol/L  [facility]   Anion Gap 7 3 - 14 mmol/L  [facility]   Glucose 117 70 - 105 mg/dL H [facility]   Urea Nitrogen 11 7 - 25 mg/dL  [facility]   Creatinine 0.78 0.70 - 1.30 mg/dL  [facility]   GFR Estimate >90 >60 mL/min/1.7m2  [facility]   GFR Estimate If Black >90 >60 mL/min/1.7m2  [facility]   Calcium 9.5 8.6 - 10.3 mg/dL  [facility]            Testing Performed By     Lab - Abbreviation Name Director Address Valid Date Range    1743 - [facility] Unknown [address]  [facility] [address] 08/07/15 0948 - Present            Unresulted Labs     None      Encounter-Level Documents:     There are no encounter-level documents.      Order-Level Documents:     There are no order-level documents.      "
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
## 2018-11-01 DIAGNOSIS — K21.9 GASTROESOPHAGEAL REFLUX DISEASE WITHOUT ESOPHAGITIS: ICD-10-CM

## 2018-11-02 NOTE — TELEPHONE ENCOUNTER
Last Clinic Visit: 7/31/2017  [facility]  Past due for Clinic visit- 14 day wayne refill sent to pharmacy.  (Has had previous 30 day wayne refill, scheduled left message with pt regarding scheduling clinic visit)-

## 2020-07-30 NOTE — MR AVS SNAPSHOT
"              After Visit Summary   1/27/2017    Gabe Reddy    MRN: [id number]           Patient Information     Date Of Birth          1990        Visit Information        Provider Department      1[facility]27/2017 [facility]        Today's Diagnoses     Tobacco use disorder    -  1       Care Instructions    Recommendations from today's MTM visit:                                                    MTM (medication therapy management) is a service provided by a clinical pharmacist designed to help you get the most of out of your medicines.   Today we reviewed what your medicines are for, how to know if they are working, that your medicines are safe and how to make your medicine regimen as easy as possible.     1. Consider switching from daily omeprazole (Prilosec) to ranitidine (Zantac). Prolonged use of omeprazole (Prilosec) can increase risk for electrolyte abnormalities (low calcium, low magnesium), reducing your overall bone density.         2. I sent order for Chantix to your pharmacy. This medication may cause mild nausea and weird dreams. Discontinue this medication and contact your provider in the case of mood changes, traumatic dreams, and suicidal thoughts.     3. Start your \"Quit Plan\" booklet.     Next MTM visit: I will call you again in two Fridays around 2:30pm    To schedule another MTM appointment, please call the clinic directly or you may call the MTM scheduling line at [phone] or toll-free at [phone].     My Clinical Pharmacist's contact information:                                                      It was a pleasure seeing you today!  Please feel free to contact me with any questions or concerns you have.      Sonal Elias, [place]  Medication Therapy Management Provider  Phone:[phone]  Pager: [phone]    You may receive a survey about the MTM services you received.  I would appreciate your feedback to help " "me serve you better in the future. Please fill it out and return it when you can. Your comments will be anonymous.                  Follow-ups after your visit        Your next 10 appointments already scheduled     Feb 10, 2017  2:30 PM   TELEMEDICINE with Sonal Elias RPH   [facility] Medication Therapy Management ([facility])    [address]  [address] [facility] [id number] [phone]           Note: this is not an onsite visit; there is no need to come to the facility.              Who to contact     If you have questions or need follow up information about today's clinic visit or your schedule please contact [facility] MEDICATION THERAPY MANAGEMENT directly at [phone].  Normal or non-critical lab and imaging results will be communicated to you by Geri[employer], letter or phone within 4 business days after the clinic has received the results. If you do not hear from us within 7 days, please contact the clinic through GeriJoyhart or phone. If you have a critical or abnormal lab result, we will notify you by phone as soon as possible.  Submit refill requests through [employer] or call your pharmacy and they will forward the refill request to us. Please allow 3 business days for your refill to be completed.          Additional Information About Your Visit        [employer] Information     [employer] lets you send messages to your doctor, view your test results, renew your prescriptions, schedule appointments and more. To sign up, go to [email or web address] . Click on \"Log in\" on the left side of the screen, which will take you to the Welcome page. Then click on \"Sign up Now\" on the right side of the page.     You will be asked to enter the access code listed below, as well as some personal information. Please follow the directions to create your username and password.     Your access code is: [email or web address]  Expires: 2017  6:30 AM     Your access code will  in 90 days. " If you need help or a new code, please call your [facility] clinic or [phone].        Care EveryWhere ID     This is your Care EveryWhere ID. This could be used by other organizations to access your [facility] medical records  [id number]        Your Vitals Were     Pulse                   89            Blood Pressure from Last 3 Encounters:   01/27/17 133/89   10/10/16 115/78   09/19/16 122/82    Weight from Last 3 Encounters:   10/10/16 216 lb 11.2 oz (98.294 kg)   09/19/16 220 lb (99.791 kg)   07/18/16 216 lb 3.2 oz (98.068 kg)              Today, you had the following     No orders found for display         Today's Medication Changes          These changes are accurate as of: 1/27/17  2:18 PM.  If you have any questions, ask your nurse or doctor.               Start taking these medicines.        Dose/Directions    varenicline 0.5 MG X 11 & 1 MG X 42 tablet   Commonly known as:  CHANTIX STARTING MONTH BREN   Used for:  Tobacco use disorder   Started by:  Sonal Elias [facility]        Take as directed per package instructions.   Quantity:  53 tablet   Refills:  0         These medicines have changed or have updated prescriptions.        Dose/Directions    omeprazole 20 MG CR capsule   Commonly known as:  priLOSEC   This may have changed:  when to take this   Used for:  Gastroesophageal reflux disease without esophagitis        Dose:  20 mg   Take 1 capsule (20 mg) by mouth daily as needed   Quantity:  90 capsule   Refills:  3            Where to get your medicines      These medications were sent to [id number] - [place], [place] - [address] AT [facility]  [address], [place] [address]    Hours:  24-hours Phone:  [phone]    - varenicline 0.5 MG X 11 & 1 MG X 42 tablet             Primary Care Provider Office Phone # Fax #    Moises Zavala [phone]        [facility] [address] [facility] [address]  [facility] [address]        Thank  you!     Thank you for choosing [facility] MEDICATION THERAPY MANAGEMENT  for your care. Our goal is always to provide you with excellent care. Hearing back from our patients is one way we can continue to improve our services. Please take a few minutes to complete the written survey that you may receive in the mail after your visit with us. Thank you!             Your Updated Medication List - Protect others around you: Learn how to safely use, store and throw away your medicines at www.disposemymeds.org.          This list is accurate as of: 1/27/17  2:18 PM.  Always use your most recent med list.                   Brand Name Dispense Instructions for use    amphetamine-dextroamphetamine 20 MG per tablet    ADDERALL    90 tablet    Take 1 tablet (20 mg) by mouth 3 times daily as needed       omeprazole 20 MG CR capsule    priLOSEC    90 capsule    Take 1 capsule (20 mg) by mouth daily as needed       varenicline 0.5 MG X 11 & 1 MG X 42 tablet    CHANTIX STARTING MONTH BREN    53 tablet    Take as directed per package instructions.          No bruits; no thyromegaly or nodules

## 2020-10-03 ENCOUNTER — VIRTUAL VISIT (OUTPATIENT)
Dept: FAMILY MEDICINE | Facility: OTHER | Age: 30
End: 2020-10-03

## 2020-10-04 NOTE — PROGRESS NOTES
"Date: 10/03/2020 23:01:22  Clinician: Mariam Moe  Clinician NPI: [id number]  Patient: Gabe Reddy  Patient : 1990  Patient Address: [address] [place] [address]  Patient Phone: [phone]  Visit Protocol: URI  Patient Summary:  Gabe is a 29 year old ( : 1990 ) male who initiated a [facility] Visit for COVID-19 (Coronavirus) evaluation and screening. When asked the question \"Please sign me up to receive news, health information and promotions. \", Gabe responded \"No\".    When asked when his symptoms started, Gabe reported that he does not have any symptoms.   He denies taking antibiotic medication in the past month and having recent facial or sinus surgery in the past 60 days.    Pertinent COVID-19 (Coronavirus) information  In the past 14 days, Gabe has not worked in a congregate living setting.   He does not work or volunteer as healthcare worker or a  and does not work or volunteer in a healthcare facility.   Gabe has lived in a congregate living setting in the past 14 days. He does not live with a healthcare worker.   Gabe has had a close contact with a laboratory-confirmed COVID-19 patient in the last 14 days. Additional information about contact with COVID-19 (Coronavirus) patient as reported by the patient (free text): My roommate, recently travelled and upon returning back said he may have been exposed after being around the home for 24 hours.  This was 10/1.   Patient reported they are living in the same household with a COVID-19 positive patient.  Patient was in an enclosed space for greater than 15 minutes with a COVID-19 patient.  Since 2019, Gabe and has not had upper respiratory infection or influenza-like illness. Has not been diagnosed with lab-confirmed COVID-19 test   Pertinent medical history  Gabe does not need a return to work/school note.   Weight: 190 lbs   Gbae smokes or uses smokeless tobacco.   Weight: 190 lbs    " MEDICATIONS: Vyvanse oral, omeprazole-sodium bicarbonate oral, gabapentin oral, ALLERGIES: NKDA  Clinician Response:  Dear Gabe,   Based on the details you've shared, you are concerned about contact with someone who may have been exposed to coronavirus (COVID-19). Based on [facility] guidelines you do not need to be tested at this time.  Testing for people who do not have symptoms is only required in certain instances, including direct contact with a person who has tested positive for COVID-19, or for those who are traveling to locations where testing is required beforehand.  Please redo an [facility] visit or call your clinic if you think we missed needed information, your exposure information has changed, or you develop symptoms.  What are the symptoms of COVID-19?  The most common symptoms are cough, fever and trouble breathing. Less common symptoms include headache, body aches, fatigue (feeling very tired), chills, sore throat, stuffy or runny nose, diarrhea (loose poop), loss of taste or smell, belly pain, and nausea or vomiting (feeling sick to your stomach or throwing up).  Where can I get more information?  [facility] -- About COVID-19: [email or web address]  CDC -- What to Do If You're Sick: www.cdc.gov/coronavirus/2019-ncov/about/steps-when-sick.html  CDC -- Ending Home Isolation: www.cdc.gov/coronavirus/2019-ncov/hcp/disposition-in-home-patients.html  [facility] -- Caring for Someone: www.cdc.gov/coronavirus/2019-ncov/if-you-are-sick/care-for-someone.html  [facility] -- Interim Guidance for Hospital Discharge to Home: www.health.[facility].[place]/diseases/coronavirus/hcp/hospdischarge.pdf  [facility] clinical trials (COVID-19 research studies): clinicalaffairs.[facility]/umn-clinical-trials  Below are the COVID-19 hotlines at the [place] Department of Health ([facility]). Interpreters are available.  For health questions: Call 529-178-6236 or 1-621.619.9113 (7 a.m. to 7 p.m.)  For questions about  schools and childcare: Call 714-458-4426 or 1-810.266.4485 (7 a.m. to 7 p.m.)    Diagnosis: Contact with and (suspected) exposure to other viral communicable diseases  Diagnosis ICD: Z20.828

## 2022-04-02 ENCOUNTER — NURSE TRIAGE (OUTPATIENT)
Dept: NURSING | Facility: CLINIC | Age: 32
End: 2022-04-02
Payer: COMMERCIAL

## 2022-04-02 NOTE — TELEPHONE ENCOUNTER
Mom calling for son with questions.  No consent to communicate found.  Advised to have patient call FNA and mom agrees.      COVID 19 Nurse Triage Plan/Patient Instructions    Please be aware that novel coronavirus (COVID-19) may be circulating in the community. If you develop symptoms such as fever, cough, or SOB or if you have concerns about the presence of another infection including coronavirus (COVID-19), please contact your health care provider or visit [email or web address].     Disposition/Instructions    Home care recommended. Follow home care protocol based instructions.    Thank you for taking steps to prevent the spread of this virus.  o Limit your contact with others.  o Wear a simple mask to cover your cough.  o Wash your hands well and often.    Resources    [facility]: About COVID-19: [email or web address]    CDC: What to Do If You're Sick: www.cdc.gov/coronavirus/2019-ncov/about/steps-when-sick.html    CDC: Ending Home Isolation: www.cdc.gov/coronavirus/2019-ncov/hcp/disposition-in-home-patients.html     CDC: Caring for Someone: www.cdc.gov/coronavirus/2019-ncov/if-you-are-sick/care-for-someone.html     [facility]: Interim Guidance for Hospital Discharge to Home: www.health.[facility].[place]/diseases/coronavirus/hcp/hospdischarge.pdf    [facility] clinical trials (COVID-19 research studies): clinicalaffairs.[facility]-clinical-trials     Below are the COVID-19 hotlines at the [facility] ([facility]). Interpreters are available.   o For health questions: Call 916-332-4584 or 1-804.539.1407 (7 a.m. to 7 p.m.)  o For questions about schools and childcare: Call 230-757-6443 or 1-417.968.9541 (7 a.m. to 7 p.m.)

## (undated) RX ORDER — HYDROCODONE BITARTRATE AND ACETAMINOPHEN 5; 325 MG/1; MG/1
TABLET ORAL
Status: DISPENSED
Start: 2018-03-05

## (undated) RX ORDER — ACETAMINOPHEN 500 MG
TABLET ORAL
Status: DISPENSED
Start: 2018-06-22

## (undated) RX ORDER — ONDANSETRON 4 MG/1
TABLET, ORALLY DISINTEGRATING ORAL
Status: DISPENSED
Start: 2018-03-05

## (undated) RX ORDER — LIDOCAINE HYDROCHLORIDE 10 MG/ML
INJECTION, SOLUTION INFILTRATION; PERINEURAL
Status: DISPENSED
Start: 2018-03-05